# Patient Record
Sex: MALE | Race: WHITE | HISPANIC OR LATINO | Employment: UNEMPLOYED | URBAN - METROPOLITAN AREA
[De-identification: names, ages, dates, MRNs, and addresses within clinical notes are randomized per-mention and may not be internally consistent; named-entity substitution may affect disease eponyms.]

---

## 2018-04-16 ENCOUNTER — HOSPITAL ENCOUNTER (INPATIENT)
Facility: MEDICAL CENTER | Age: 38
LOS: 11 days | DRG: 544 | End: 2018-04-27
Attending: EMERGENCY MEDICINE | Admitting: INTERNAL MEDICINE
Payer: MEDICAID

## 2018-04-16 ENCOUNTER — RESOLUTE PROFESSIONAL BILLING HOSPITAL PROF FEE (OUTPATIENT)
Dept: HOSPITALIST | Facility: MEDICAL CENTER | Age: 38
End: 2018-04-16
Payer: MEDICAID

## 2018-04-16 ENCOUNTER — HOSPITAL ENCOUNTER (OUTPATIENT)
Dept: RADIOLOGY | Facility: MEDICAL CENTER | Age: 38
End: 2018-04-16

## 2018-04-16 ENCOUNTER — APPOINTMENT (OUTPATIENT)
Dept: RADIOLOGY | Facility: MEDICAL CENTER | Age: 38
DRG: 544 | End: 2018-04-16
Attending: EMERGENCY MEDICINE
Payer: MEDICAID

## 2018-04-16 DIAGNOSIS — M25.569 KNEE PAIN, UNSPECIFIED CHRONICITY, UNSPECIFIED LATERALITY: ICD-10-CM

## 2018-04-16 DIAGNOSIS — S72.91XA CLOSED FRACTURE OF RIGHT FEMUR, UNSPECIFIED FRACTURE MORPHOLOGY, UNSPECIFIED PORTION OF FEMUR, INITIAL ENCOUNTER (HCC): ICD-10-CM

## 2018-04-16 DIAGNOSIS — R74.8 ELEVATED ALKALINE PHOSPHATASE LEVEL: ICD-10-CM

## 2018-04-16 DIAGNOSIS — M84.551K: ICD-10-CM

## 2018-04-16 DIAGNOSIS — S72.90XA CLOSED FRACTURE OF FEMUR, UNSPECIFIED FRACTURE MORPHOLOGY, UNSPECIFIED LATERALITY, UNSPECIFIED PORTION OF FEMUR, INITIAL ENCOUNTER (HCC): ICD-10-CM

## 2018-04-16 LAB
ALBUMIN SERPL BCP-MCNC: 3.8 G/DL (ref 3.2–4.9)
ALBUMIN/GLOB SERPL: 1.4 G/DL
ALP SERPL-CCNC: 507 U/L (ref 30–99)
ALT SERPL-CCNC: 26 U/L (ref 2–50)
ANION GAP SERPL CALC-SCNC: 7 MMOL/L (ref 0–11.9)
APTT PPP: 23.6 SEC (ref 24.7–36)
AST SERPL-CCNC: 32 U/L (ref 12–45)
BASOPHILS # BLD AUTO: 0.2 % (ref 0–1.8)
BASOPHILS # BLD: 0.02 K/UL (ref 0–0.12)
BILIRUB SERPL-MCNC: 0.5 MG/DL (ref 0.1–1.5)
BUN SERPL-MCNC: 16 MG/DL (ref 8–22)
CALCIUM SERPL-MCNC: 8.4 MG/DL (ref 8.5–10.5)
CHLORIDE SERPL-SCNC: 108 MMOL/L (ref 96–112)
CO2 SERPL-SCNC: 27 MMOL/L (ref 20–33)
CREAT SERPL-MCNC: 1.04 MG/DL (ref 0.5–1.4)
EOSINOPHIL # BLD AUTO: 0.08 K/UL (ref 0–0.51)
EOSINOPHIL NFR BLD: 0.8 % (ref 0–6.9)
ERYTHROCYTE [DISTWIDTH] IN BLOOD BY AUTOMATED COUNT: 42.7 FL (ref 35.9–50)
GLOBULIN SER CALC-MCNC: 2.7 G/DL (ref 1.9–3.5)
GLUCOSE SERPL-MCNC: 100 MG/DL (ref 65–99)
HCT VFR BLD AUTO: 43.6 % (ref 42–52)
HGB BLD-MCNC: 14.5 G/DL (ref 14–18)
IMM GRANULOCYTES # BLD AUTO: 0.03 K/UL (ref 0–0.11)
IMM GRANULOCYTES NFR BLD AUTO: 0.3 % (ref 0–0.9)
INR PPP: 1.08 (ref 0.87–1.13)
LYMPHOCYTES # BLD AUTO: 1.7 K/UL (ref 1–4.8)
LYMPHOCYTES NFR BLD: 16.8 % (ref 22–41)
MCH RBC QN AUTO: 30.6 PG (ref 27–33)
MCHC RBC AUTO-ENTMCNC: 33.3 G/DL (ref 33.7–35.3)
MCV RBC AUTO: 92 FL (ref 81.4–97.8)
MONOCYTES # BLD AUTO: 0.62 K/UL (ref 0–0.85)
MONOCYTES NFR BLD AUTO: 6.1 % (ref 0–13.4)
NEUTROPHILS # BLD AUTO: 7.66 K/UL (ref 1.82–7.42)
NEUTROPHILS NFR BLD: 75.8 % (ref 44–72)
NRBC # BLD AUTO: 0 K/UL
NRBC BLD-RTO: 0 /100 WBC
PLATELET # BLD AUTO: 200 K/UL (ref 164–446)
PMV BLD AUTO: 10.8 FL (ref 9–12.9)
POTASSIUM SERPL-SCNC: 3.7 MMOL/L (ref 3.6–5.5)
PROT SERPL-MCNC: 6.5 G/DL (ref 6–8.2)
PROTHROMBIN TIME: 13.7 SEC (ref 12–14.6)
RBC # BLD AUTO: 4.74 M/UL (ref 4.7–6.1)
SODIUM SERPL-SCNC: 142 MMOL/L (ref 135–145)
WBC # BLD AUTO: 10.1 K/UL (ref 4.8–10.8)

## 2018-04-16 PROCEDURE — 700111 HCHG RX REV CODE 636 W/ 250 OVERRIDE (IP): Performed by: EMERGENCY MEDICINE

## 2018-04-16 PROCEDURE — 700111 HCHG RX REV CODE 636 W/ 250 OVERRIDE (IP): Performed by: INTERNAL MEDICINE

## 2018-04-16 PROCEDURE — 99285 EMERGENCY DEPT VISIT HI MDM: CPT

## 2018-04-16 PROCEDURE — 73700 CT LOWER EXTREMITY W/O DYE: CPT | Mod: LT

## 2018-04-16 PROCEDURE — 770001 HCHG ROOM/CARE - MED/SURG/GYN PRIV*

## 2018-04-16 PROCEDURE — A9270 NON-COVERED ITEM OR SERVICE: HCPCS | Performed by: INTERNAL MEDICINE

## 2018-04-16 PROCEDURE — 85730 THROMBOPLASTIN TIME PARTIAL: CPT

## 2018-04-16 PROCEDURE — 96374 THER/PROPH/DIAG INJ IV PUSH: CPT

## 2018-04-16 PROCEDURE — 85025 COMPLETE CBC W/AUTO DIFF WBC: CPT

## 2018-04-16 PROCEDURE — 80053 COMPREHEN METABOLIC PANEL: CPT

## 2018-04-16 PROCEDURE — 96376 TX/PRO/DX INJ SAME DRUG ADON: CPT

## 2018-04-16 PROCEDURE — 96375 TX/PRO/DX INJ NEW DRUG ADDON: CPT

## 2018-04-16 PROCEDURE — 99223 1ST HOSP IP/OBS HIGH 75: CPT | Performed by: INTERNAL MEDICINE

## 2018-04-16 PROCEDURE — 700105 HCHG RX REV CODE 258: Performed by: INTERNAL MEDICINE

## 2018-04-16 PROCEDURE — 85610 PROTHROMBIN TIME: CPT

## 2018-04-16 PROCEDURE — 700105 HCHG RX REV CODE 258: Performed by: EMERGENCY MEDICINE

## 2018-04-16 PROCEDURE — 700102 HCHG RX REV CODE 250 W/ 637 OVERRIDE(OP): Performed by: INTERNAL MEDICINE

## 2018-04-16 RX ORDER — MORPHINE SULFATE 4 MG/ML
4 INJECTION, SOLUTION INTRAMUSCULAR; INTRAVENOUS
Status: DISCONTINUED | OUTPATIENT
Start: 2018-04-16 | End: 2018-04-16

## 2018-04-16 RX ORDER — AMOXICILLIN 250 MG
2 CAPSULE ORAL 2 TIMES DAILY
Status: DISCONTINUED | OUTPATIENT
Start: 2018-04-16 | End: 2018-04-27 | Stop reason: HOSPADM

## 2018-04-16 RX ORDER — SODIUM CHLORIDE 9 MG/ML
INJECTION, SOLUTION INTRAVENOUS CONTINUOUS
Status: DISCONTINUED | OUTPATIENT
Start: 2018-04-16 | End: 2018-04-16

## 2018-04-16 RX ORDER — ONDANSETRON 2 MG/ML
4 INJECTION INTRAMUSCULAR; INTRAVENOUS
Status: DISCONTINUED | OUTPATIENT
Start: 2018-04-16 | End: 2018-04-16

## 2018-04-16 RX ORDER — OXYCODONE HCL 10 MG/1
10 TABLET, FILM COATED, EXTENDED RELEASE ORAL EVERY 12 HOURS
Status: DISCONTINUED | OUTPATIENT
Start: 2018-04-16 | End: 2018-04-27 | Stop reason: HOSPADM

## 2018-04-16 RX ORDER — ONDANSETRON 2 MG/ML
4 INJECTION INTRAMUSCULAR; INTRAVENOUS EVERY 4 HOURS PRN
Status: DISCONTINUED | OUTPATIENT
Start: 2018-04-16 | End: 2018-04-27 | Stop reason: HOSPADM

## 2018-04-16 RX ORDER — BISACODYL 10 MG
10 SUPPOSITORY, RECTAL RECTAL
Status: DISCONTINUED | OUTPATIENT
Start: 2018-04-16 | End: 2018-04-27 | Stop reason: HOSPADM

## 2018-04-16 RX ORDER — SODIUM CHLORIDE 9 MG/ML
INJECTION, SOLUTION INTRAVENOUS CONTINUOUS
Status: DISPENSED | OUTPATIENT
Start: 2018-04-16 | End: 2018-04-17

## 2018-04-16 RX ORDER — MORPHINE SULFATE 4 MG/ML
2 INJECTION, SOLUTION INTRAMUSCULAR; INTRAVENOUS
Status: DISCONTINUED | OUTPATIENT
Start: 2018-04-16 | End: 2018-04-17

## 2018-04-16 RX ORDER — ACETAMINOPHEN 325 MG/1
650 TABLET ORAL EVERY 6 HOURS PRN
Status: DISCONTINUED | OUTPATIENT
Start: 2018-04-16 | End: 2018-04-27 | Stop reason: HOSPADM

## 2018-04-16 RX ORDER — POLYETHYLENE GLYCOL 3350 17 G/17G
1 POWDER, FOR SOLUTION ORAL
Status: DISCONTINUED | OUTPATIENT
Start: 2018-04-16 | End: 2018-04-27 | Stop reason: HOSPADM

## 2018-04-16 RX ORDER — ACETAMINOPHEN 500 MG
1000 TABLET ORAL EVERY 6 HOURS
Status: DISCONTINUED | OUTPATIENT
Start: 2018-04-17 | End: 2018-04-27 | Stop reason: HOSPADM

## 2018-04-16 RX ORDER — DIAZEPAM 2 MG/1
2 TABLET ORAL EVERY 6 HOURS PRN
Status: DISCONTINUED | OUTPATIENT
Start: 2018-04-16 | End: 2018-04-27 | Stop reason: HOSPADM

## 2018-04-16 RX ORDER — ONDANSETRON 4 MG/1
4 TABLET, ORALLY DISINTEGRATING ORAL EVERY 4 HOURS PRN
Status: DISCONTINUED | OUTPATIENT
Start: 2018-04-16 | End: 2018-04-27 | Stop reason: HOSPADM

## 2018-04-16 RX ADMIN — SODIUM CHLORIDE: 9 INJECTION, SOLUTION INTRAVENOUS at 16:58

## 2018-04-16 RX ADMIN — ONDANSETRON HYDROCHLORIDE 4 MG: 2 INJECTION, SOLUTION INTRAMUSCULAR; INTRAVENOUS at 16:58

## 2018-04-16 RX ADMIN — OXYCODONE HYDROCHLORIDE 10 MG: 10 TABLET, FILM COATED, EXTENDED RELEASE ORAL at 22:27

## 2018-04-16 RX ADMIN — MORPHINE SULFATE 2 MG: 4 INJECTION INTRAVENOUS at 22:15

## 2018-04-16 RX ADMIN — DIAZEPAM 2 MG: 2 TABLET ORAL at 18:55

## 2018-04-16 RX ADMIN — MORPHINE SULFATE 2 MG: 4 INJECTION INTRAVENOUS at 18:57

## 2018-04-16 RX ADMIN — SODIUM CHLORIDE: 9 INJECTION, SOLUTION INTRAVENOUS at 18:59

## 2018-04-16 RX ADMIN — MORPHINE SULFATE 4 MG: 4 INJECTION INTRAVENOUS at 16:58

## 2018-04-16 ASSESSMENT — PAIN SCALES - GENERAL
PAINLEVEL_OUTOF10: 7
PAINLEVEL_OUTOF10: 9
PAINLEVEL_OUTOF10: 7
PAINLEVEL_OUTOF10: 5
PAINLEVEL_OUTOF10: 7
PAINLEVEL_OUTOF10: 5

## 2018-04-16 ASSESSMENT — ENCOUNTER SYMPTOMS
ABDOMINAL PAIN: 0
MYALGIAS: 1
FOCAL WEAKNESS: 1
WEIGHT LOSS: 0
CHILLS: 0
DIZZINESS: 0
LOSS OF CONSCIOUSNESS: 0
FEVER: 0
PALPITATIONS: 0
DEPRESSION: 0
NAUSEA: 0
SHORTNESS OF BREATH: 0
VOMITING: 0
NECK PAIN: 0
BLURRED VISION: 0
BRUISES/BLEEDS EASILY: 0

## 2018-04-16 ASSESSMENT — PATIENT HEALTH QUESTIONNAIRE - PHQ9
1. LITTLE INTEREST OR PLEASURE IN DOING THINGS: NOT AT ALL
2. FEELING DOWN, DEPRESSED, IRRITABLE, OR HOPELESS: NOT AT ALL
SUM OF ALL RESPONSES TO PHQ9 QUESTIONS 1 AND 2: 0

## 2018-04-16 ASSESSMENT — LIFESTYLE VARIABLES
EVER_SMOKED: YES
DO YOU DRINK ALCOHOL: NO
ALCOHOL_USE: NO

## 2018-04-17 ENCOUNTER — APPOINTMENT (OUTPATIENT)
Dept: RADIOLOGY | Facility: MEDICAL CENTER | Age: 38
DRG: 544 | End: 2018-04-17
Attending: EMERGENCY MEDICINE
Payer: MEDICAID

## 2018-04-17 ENCOUNTER — APPOINTMENT (OUTPATIENT)
Dept: RADIOLOGY | Facility: MEDICAL CENTER | Age: 38
DRG: 544 | End: 2018-04-17
Attending: ORTHOPAEDIC SURGERY
Payer: MEDICAID

## 2018-04-17 PROBLEM — M84.453A PATHOLOGIC FRACTURE OF FEMUR (HCC): Status: ACTIVE | Noted: 2018-04-16

## 2018-04-17 LAB
ANION GAP SERPL CALC-SCNC: 3 MMOL/L (ref 0–11.9)
BASOPHILS # BLD AUTO: 0.3 % (ref 0–1.8)
BASOPHILS # BLD: 0.03 K/UL (ref 0–0.12)
BUN SERPL-MCNC: 14 MG/DL (ref 8–22)
CALCIUM SERPL-MCNC: 8.1 MG/DL (ref 8.5–10.5)
CHLORIDE SERPL-SCNC: 105 MMOL/L (ref 96–112)
CO2 SERPL-SCNC: 29 MMOL/L (ref 20–33)
CREAT SERPL-MCNC: 1.07 MG/DL (ref 0.5–1.4)
EOSINOPHIL # BLD AUTO: 0.17 K/UL (ref 0–0.51)
EOSINOPHIL NFR BLD: 1.9 % (ref 0–6.9)
ERYTHROCYTE [DISTWIDTH] IN BLOOD BY AUTOMATED COUNT: 44.7 FL (ref 35.9–50)
GLUCOSE SERPL-MCNC: 112 MG/DL (ref 65–99)
HCT VFR BLD AUTO: 40.1 % (ref 42–52)
HGB BLD-MCNC: 13.1 G/DL (ref 14–18)
IMM GRANULOCYTES # BLD AUTO: 0.04 K/UL (ref 0–0.11)
IMM GRANULOCYTES NFR BLD AUTO: 0.5 % (ref 0–0.9)
LYMPHOCYTES # BLD AUTO: 2.14 K/UL (ref 1–4.8)
LYMPHOCYTES NFR BLD: 24.4 % (ref 22–41)
MCH RBC QN AUTO: 30.6 PG (ref 27–33)
MCHC RBC AUTO-ENTMCNC: 32.7 G/DL (ref 33.7–35.3)
MCV RBC AUTO: 93.7 FL (ref 81.4–97.8)
MONOCYTES # BLD AUTO: 0.76 K/UL (ref 0–0.85)
MONOCYTES NFR BLD AUTO: 8.7 % (ref 0–13.4)
NEUTROPHILS # BLD AUTO: 5.62 K/UL (ref 1.82–7.42)
NEUTROPHILS NFR BLD: 64.2 % (ref 44–72)
NRBC # BLD AUTO: 0 K/UL
NRBC BLD-RTO: 0 /100 WBC
PLATELET # BLD AUTO: 183 K/UL (ref 164–446)
PMV BLD AUTO: 11 FL (ref 9–12.9)
POTASSIUM SERPL-SCNC: 4.2 MMOL/L (ref 3.6–5.5)
RBC # BLD AUTO: 4.28 M/UL (ref 4.7–6.1)
SODIUM SERPL-SCNC: 137 MMOL/L (ref 135–145)
WBC # BLD AUTO: 8.8 K/UL (ref 4.8–10.8)

## 2018-04-17 PROCEDURE — 700111 HCHG RX REV CODE 636 W/ 250 OVERRIDE (IP): Performed by: STUDENT IN AN ORGANIZED HEALTH CARE EDUCATION/TRAINING PROGRAM

## 2018-04-17 PROCEDURE — 73706 CT ANGIO LWR EXTR W/O&W/DYE: CPT | Mod: RT

## 2018-04-17 PROCEDURE — 770001 HCHG ROOM/CARE - MED/SURG/GYN PRIV*

## 2018-04-17 PROCEDURE — 36415 COLL VENOUS BLD VENIPUNCTURE: CPT

## 2018-04-17 PROCEDURE — 700117 HCHG RX CONTRAST REV CODE 255: Performed by: EMERGENCY MEDICINE

## 2018-04-17 PROCEDURE — 700111 HCHG RX REV CODE 636 W/ 250 OVERRIDE (IP): Performed by: INTERNAL MEDICINE

## 2018-04-17 PROCEDURE — 85025 COMPLETE CBC W/AUTO DIFF WBC: CPT

## 2018-04-17 PROCEDURE — 99231 SBSQ HOSP IP/OBS SF/LOW 25: CPT | Performed by: INTERNAL MEDICINE

## 2018-04-17 PROCEDURE — 302830 HCHG BUCKS UNIVERSAL TRACTION BOOT

## 2018-04-17 PROCEDURE — A9270 NON-COVERED ITEM OR SERVICE: HCPCS | Performed by: INTERNAL MEDICINE

## 2018-04-17 PROCEDURE — 51798 US URINE CAPACITY MEASURE: CPT

## 2018-04-17 PROCEDURE — 700102 HCHG RX REV CODE 250 W/ 637 OVERRIDE(OP): Performed by: INTERNAL MEDICINE

## 2018-04-17 PROCEDURE — 80048 BASIC METABOLIC PNL TOTAL CA: CPT

## 2018-04-17 PROCEDURE — 700102 HCHG RX REV CODE 250 W/ 637 OVERRIDE(OP): Performed by: ORTHOPAEDIC SURGERY

## 2018-04-17 PROCEDURE — A9270 NON-COVERED ITEM OR SERVICE: HCPCS | Performed by: ORTHOPAEDIC SURGERY

## 2018-04-17 PROCEDURE — 2W3LXYZ IMMOBILIZATION OF RIGHT LOWER EXTREMITY USING OTHER DEVICE: ICD-10-PCS | Performed by: ORTHOPAEDIC SURGERY

## 2018-04-17 PROCEDURE — 700105 HCHG RX REV CODE 258: Performed by: INTERNAL MEDICINE

## 2018-04-17 RX ORDER — OXYCODONE HYDROCHLORIDE 10 MG/1
10 TABLET ORAL EVERY 4 HOURS PRN
Status: DISCONTINUED | OUTPATIENT
Start: 2018-04-17 | End: 2018-04-25

## 2018-04-17 RX ORDER — MORPHINE SULFATE 4 MG/ML
4 INJECTION, SOLUTION INTRAMUSCULAR; INTRAVENOUS
Status: DISCONTINUED | OUTPATIENT
Start: 2018-04-17 | End: 2018-04-25

## 2018-04-17 RX ORDER — MORPHINE SULFATE 4 MG/ML
2 INJECTION, SOLUTION INTRAMUSCULAR; INTRAVENOUS ONCE
Status: COMPLETED | OUTPATIENT
Start: 2018-04-17 | End: 2018-04-17

## 2018-04-17 RX ORDER — KETOROLAC TROMETHAMINE 30 MG/ML
30 INJECTION, SOLUTION INTRAMUSCULAR; INTRAVENOUS EVERY 6 HOURS PRN
Status: DISPENSED | OUTPATIENT
Start: 2018-04-17 | End: 2018-04-22

## 2018-04-17 RX ORDER — OXYCODONE HYDROCHLORIDE 5 MG/1
5 TABLET ORAL EVERY 4 HOURS PRN
Status: DISCONTINUED | OUTPATIENT
Start: 2018-04-17 | End: 2018-04-25

## 2018-04-17 RX ADMIN — OXYCODONE HYDROCHLORIDE 10 MG: 10 TABLET, FILM COATED, EXTENDED RELEASE ORAL at 20:01

## 2018-04-17 RX ADMIN — OXYCODONE HYDROCHLORIDE 10 MG: 10 TABLET ORAL at 17:18

## 2018-04-17 RX ADMIN — DIAZEPAM 2 MG: 2 TABLET ORAL at 15:26

## 2018-04-17 RX ADMIN — MORPHINE SULFATE 2 MG: 4 INJECTION INTRAVENOUS at 03:08

## 2018-04-17 RX ADMIN — DIAZEPAM 2 MG: 2 TABLET ORAL at 07:20

## 2018-04-17 RX ADMIN — MORPHINE SULFATE 4 MG: 4 INJECTION INTRAVENOUS at 13:04

## 2018-04-17 RX ADMIN — DIAZEPAM 2 MG: 2 TABLET ORAL at 01:14

## 2018-04-17 RX ADMIN — OXYCODONE HYDROCHLORIDE 10 MG: 10 TABLET, FILM COATED, EXTENDED RELEASE ORAL at 08:56

## 2018-04-17 RX ADMIN — ACETAMINOPHEN 1000 MG: 500 TABLET ORAL at 17:18

## 2018-04-17 RX ADMIN — OXYCODONE HYDROCHLORIDE 10 MG: 10 TABLET ORAL at 12:01

## 2018-04-17 RX ADMIN — MORPHINE SULFATE 4 MG: 4 INJECTION INTRAVENOUS at 20:01

## 2018-04-17 RX ADMIN — MORPHINE SULFATE 2 MG: 4 INJECTION INTRAVENOUS at 01:15

## 2018-04-17 RX ADMIN — DIAZEPAM 2 MG: 2 TABLET ORAL at 21:40

## 2018-04-17 RX ADMIN — ACETAMINOPHEN 1000 MG: 500 TABLET ORAL at 07:20

## 2018-04-17 RX ADMIN — MORPHINE SULFATE 2 MG: 4 INJECTION INTRAVENOUS at 04:37

## 2018-04-17 RX ADMIN — ACETAMINOPHEN 1000 MG: 500 TABLET ORAL at 12:01

## 2018-04-17 RX ADMIN — SODIUM CHLORIDE: 9 INJECTION, SOLUTION INTRAVENOUS at 12:10

## 2018-04-17 RX ADMIN — OXYCODONE HYDROCHLORIDE 10 MG: 10 TABLET ORAL at 21:38

## 2018-04-17 RX ADMIN — ACETAMINOPHEN 1000 MG: 500 TABLET ORAL at 01:14

## 2018-04-17 RX ADMIN — MORPHINE SULFATE 4 MG: 4 INJECTION INTRAVENOUS at 09:50

## 2018-04-17 RX ADMIN — MORPHINE SULFATE 4 MG: 4 INJECTION INTRAVENOUS at 15:22

## 2018-04-17 RX ADMIN — ENOXAPARIN SODIUM 40 MG: 100 INJECTION SUBCUTANEOUS at 12:17

## 2018-04-17 RX ADMIN — IOHEXOL 100 ML: 350 INJECTION, SOLUTION INTRAVENOUS at 08:15

## 2018-04-17 RX ADMIN — MORPHINE SULFATE 2 MG: 4 INJECTION INTRAVENOUS at 07:50

## 2018-04-17 RX ADMIN — SODIUM CHLORIDE: 9 INJECTION, SOLUTION INTRAVENOUS at 01:18

## 2018-04-17 ASSESSMENT — ENCOUNTER SYMPTOMS
ABDOMINAL PAIN: 0
CHILLS: 0
VOMITING: 0
WEIGHT LOSS: 0
DEPRESSION: 0
NERVOUS/ANXIOUS: 1
SHORTNESS OF BREATH: 0
NAUSEA: 0
FEVER: 0

## 2018-04-17 ASSESSMENT — PAIN SCALES - GENERAL
PAINLEVEL_OUTOF10: 9
PAINLEVEL_OUTOF10: 8
PAINLEVEL_OUTOF10: 9
PAINLEVEL_OUTOF10: 9
PAINLEVEL_OUTOF10: 5
PAINLEVEL_OUTOF10: 8
PAINLEVEL_OUTOF10: 7
PAINLEVEL_OUTOF10: 8
PAINLEVEL_OUTOF10: 9
PAINLEVEL_OUTOF10: 10
PAINLEVEL_OUTOF10: 7
PAINLEVEL_OUTOF10: 9
PAINLEVEL_OUTOF10: 4
PAINLEVEL_OUTOF10: 9

## 2018-04-17 ASSESSMENT — COPD QUESTIONNAIRES
DO YOU EVER COUGH UP ANY MUCUS OR PHLEGM?: NO/ONLY WITH OCCASIONAL COLDS OR INFECTIONS
HAVE YOU SMOKED AT LEAST 100 CIGARETTES IN YOUR ENTIRE LIFE: YES
DURING THE PAST 4 WEEKS HOW MUCH DID YOU FEEL SHORT OF BREATH: NONE/LITTLE OF THE TIME
COPD SCREENING SCORE: 2

## 2018-04-17 ASSESSMENT — LIFESTYLE VARIABLES: EVER_SMOKED: YES

## 2018-04-17 NOTE — H&P
Hospital Medicine History and Physical    Date of Service  4/16/2018    Chief Complaint  Chief Complaint   Patient presents with   • Leg Injury       History of Presenting Illness  37 y.o. male who presented 4/16/2018 with above chief complaint. And states that he's been having knee pain over the last several weeks and this was given indomethacin but never received it at the [facility]. Patient states that today he was just sitting crosslegged and then went to go expand his legs in and felt a sudden pop and crack just above his right knee min had tremendous 10/ 10 sharp stabbing pain that shot down his leg. He was highly concerned that he had broken it and therefore got an x-ray done at the [facility] which showed a distal femur fracture and he was transferred here. Patient denies any obvious trauma other nurse and endorses some mild numbness of the right foot but has good range of motion in size about 8 out of 10 pain with associated severe muscle spasms now. Denies any weight loss or weight gain denies any nausea vomiting fevers or chills and denies any new other medications. Orthopedic surgery was consulted in the ER.    Primary Care Physician  Pcp Pt States None    Code Status  fc/fc    Review of Systems  Review of Systems   Constitutional: Negative for chills, fever and weight loss.   HENT: Negative for hearing loss.    Eyes: Negative for blurred vision.   Respiratory: Negative for shortness of breath.    Cardiovascular: Positive for leg swelling. Negative for chest pain and palpitations.   Gastrointestinal: Negative for abdominal pain, nausea and vomiting.   Genitourinary: Negative for dysuria and urgency.   Musculoskeletal: Positive for joint pain and myalgias. Negative for neck pain.   Skin: Negative for itching.   Neurological: Positive for focal weakness. Negative for dizziness and loss of consciousness.   Endo/Heme/Allergies: Does not bruise/bleed easily.   Psychiatric/Behavioral: Negative for depression.    All other systems reviewed and are negative.         Past Medical History  None per patient    Surgical History  Denies any prior surgeries    Medications  No current facility-administered medications on file prior to encounter.      No current outpatient prescriptions on file prior to encounter.       Family History  No history of malignancies    Social History  Social History   Substance Use Topics   • Smoking status: Never Smoker   • Smokeless tobacco: Never Used   • Alcohol use No       Allergies  No Known Allergies     Physical Exam  Laboratory   Hemodynamics  Temp (24hrs), Av.9 °C (98.4 °F), Min:36.8 °C (98.2 °F), Max:37 °C (98.6 °F)   Temperature: 37 °C (98.6 °F)  Pulse  Av.4  Min: 62  Max: 90 Heart Rate (Monitored): 64  Blood Pressure: 145/84, NIBP: 147/87      Respiratory      Respiration: 16, Pulse Oximetry: 99 %             Physical Exam   Constitutional: He is oriented to person, place, and time. He appears well-developed and well-nourished. No distress.   Laying supine in mild discomfort   HENT:   Head: Normocephalic and atraumatic.   Mouth/Throat: No oropharyngeal exudate.   Eyes: Pupils are equal, round, and reactive to light. No scleral icterus.   Neck: Normal range of motion. Neck supple. No thyromegaly present.   Cardiovascular: Normal rate, regular rhythm, normal heart sounds and intact distal pulses.    No murmur heard.  Pulmonary/Chest: Effort normal and breath sounds normal. No respiratory distress. He has no wheezes.   Abdominal: Soft. Bowel sounds are normal. He exhibits no distension. There is no tenderness.   Musculoskeletal: Normal range of motion. He exhibits edema and tenderness.   RLE in elongated ice packet, severe TTP in the distal femur, decrease sensation to soft touch R foot and associated toes, but good capillary refill throughout and good ROM   Neurological: He is alert and oriented to person, place, and time. No cranial nerve deficit. Coordination abnormal.   Skin:  Skin is warm and dry. No rash noted.   Psychiatric: He has a normal mood and affect.   Nursing note and vitals reviewed.      Recent Labs      04/16/18   1708   WBC  10.1   RBC  4.74   HEMOGLOBIN  14.5   HEMATOCRIT  43.6   MCV  92.0   MCH  30.6   MCHC  33.3*   RDW  42.7   PLATELETCT  200   MPV  10.8     Recent Labs      04/16/18   1708   SODIUM  142   POTASSIUM  3.7   CHLORIDE  108   CO2  27   GLUCOSE  100*   BUN  16   CREATININE  1.04   CALCIUM  8.4*     Recent Labs      04/16/18   1708   APTT  23.6*   INR  1.08               Imaging  CT-EXTREMITY, LOWER W/O LEFT   Final Result      Comminuted angulated displaced distal right femoral fracture.   Suspect underlying malignant bone lesion.      OUTSIDE IMAGES-DX LOWER EXTREMITY, RIGHT   Final Result      MR-FEMUR-WITH & W/O RIGHT    (Results Pending)        Assessment/Plan     I anticipate this patient will require at least two midnights for appropriate medical management, necessitating inpatient admission.    * Femur fracture (CMS-HCC)- (present on admission)   Assessment & Plan    -Nontraumatic and in the distal part  -CT scan is highly concerning for primary bone cancer and also given the history of known trauma and also grossly elevated alkaline phosphatase with consider possible Paget's disease versus osteosarcoma versus evening sarcoma  -Orthopedic surgeries been consulted  -Pain control multiple pain therapy  -Maintain in a sling  -Likely will need transfer to a tertiary academic Medical Center  -MRI of the femur without/with contrast        Elevated alkaline phosphatase level- (present on admission)   Assessment & Plan    -Highly concerning for possible primary bone cancer, no hypercalcemia or kidney failure on labs        Knee pain- (present on admission)   Assessment & Plan    -Likely element of osteoarthritis and institute multimodal pain therapy            VTE prophylaxis SCD's.

## 2018-04-17 NOTE — PROGRESS NOTES
Report received. Assumed care of pt. A/O x4. VSS. Responds appropriately.c/o of pain, medicated per MAR. Assessment complete, RLE NWB,splint in place. Pending MRI. senior living guards at bedside. Explained importance of calling before getting OOB. Call light and belongings within reach. . Bed in the lowest position. Treaded socks in place. Hourly rounding in progress. Will continue to monitor .

## 2018-04-17 NOTE — DISCHARGE PLANNING
Ty from [facility] called stating that patient is declined due to lack of financial clearance. They do not have contracts with any correctional facilities and will not do a HSUSEIN with correctional facilities for patient transfer.     Left message for floor CM to call back for update. SW updated.

## 2018-04-17 NOTE — ED PROVIDER NOTES
"ED Provider Note    CHIEF COMPLAINT  Chief Complaint   Patient presents with   • Leg Injury       HPI  Renetta Tucker is a 37 y.o. male who presents complaining of a leg injury. The patient was sitting crosslegged on his bunk, moved awkwardly, and felt a snap. He's had some issues with his knee before, the last 3 weeks or so. He was told it was a strained meniscus. He localizes that pain to the tibia more so than the femur. He was supposed to be on Indocin, however has not yet started that. Today's pain hurts quite a bit in his distal right thigh. He feels some numbness in the tips of his toes which he thinks is from the splint. He denies any weakness at all. Denies any pain in the foot. Denies any chest pain or shortness of breath. Last food was about 10:30 this morning. There is no other complaint.    PAST MEDICAL HISTORY  None    FAMILY HISTORY  History reviewed. No pertinent family history.    SOCIAL HISTORY  Social History   Substance Use Topics   • Smoking status: Never Smoker   • Smokeless tobacco: Never Used   • Alcohol use No     He is incarcerated    SURGICAL HISTORY  History reviewed. No pertinent surgical history.    CURRENT MEDICATIONS  Home Medications     Reviewed by Ronda Wilkinson (Pharmacy Tech) on 04/16/18 at 1751  Med List Status: Complete   Medication Last Dose Status   multivitamin (THERAGRAN) Tab 4/16/2018 Active                I have reviewed the nurses notes and/or the list brought with the patient.    ALLERGIES  No Known Allergies    REVIEW OF SYSTEMS  See HPI for further details. Review of systems as above, otherwise all other systems are negative.     PHYSICAL EXAM  VITAL SIGNS: /81   Pulse 90   Temp 36.8 °C (98.2 °F)   Resp 18   Ht 2.108 m (6' 11\")   Wt 83.9 kg (185 lb)   SpO2 92%   BMI 18.88 kg/m²   Constitutional: Well appearing patient in no acute distress.  Not toxic, nor ill in appearance.  HENT: Mucus membranes moist.  Oropharynx is clear.  Eyes: Pupils equally " round.  No scleral icterus.   Neck: Full nontender range of motion.  Lymphatic: No cervical lymphadenopathy noted.   Cardiovascular: Regular heart rate and rhythm.  No murmurs, rubs, nor gallop appreciated.   Thorax & Lungs: Chest is nontender.  Lungs are clear to auscultation with good air movement bilaterally.  No wheeze, rhonchi, nor rales.   Abdomen: Soft, with no tenderness, rebound nor guarding.  No mass, pulsatile mass, nor hepatosplenomegaly appreciated.  Skin: No purpura nor petechia noted.  Extremities/Musculoskeletal: He has tenderness and edema about the distal thigh. This was examined with an air splint in place. I did not remove it. The foot is warm and well perfused. He has no objective deficit in sensation. Pulses strong and bounding the foot.  Neurologic: Alert & oriented. As above.  Psychiatric: Normal affect appropriate for the clinical situation.      LABS  Labs Reviewed   CBC WITH DIFFERENTIAL - Abnormal; Notable for the following:        Result Value    MCHC 33.3 (*)     Neutrophils-Polys 75.80 (*)     Lymphocytes 16.80 (*)     Neutrophils (Absolute) 7.66 (*)     All other components within normal limits    Narrative:     Indicate which anticoagulants the patient is on:->NONE   COMP METABOLIC PANEL - Abnormal; Notable for the following:     Glucose 100 (*)     Calcium 8.4 (*)     Alkaline Phosphatase 507 (*)     All other components within normal limits    Narrative:     Indicate which anticoagulants the patient is on:->NONE   APTT - Abnormal; Notable for the following:     APTT 23.6 (*)     All other components within normal limits    Narrative:     Indicate which anticoagulants the patient is on:->NONE   PROTHROMBIN TIME    Narrative:     Indicate which anticoagulants the patient is on:->NONE   ESTIMATED GFR    Narrative:     Indicate which anticoagulants the patient is on:->NONE         RADIOLOGY/PROCEDURES  I have reviewed the patient's film interpretations myself, and they are read out by  the radiologist as:   OUTSIDE IMAGES-DX LOWER EXTREMITY, RIGHT   Final Result      CT-EXTREMITY, LOWER W/O LEFT    (Results Pending)     .    MEDICAL RECORD  I have reviewed patient's medical record and pertinent results are listed above.    COURSE & MEDICAL DECISION MAKING  I have reviewed any medical record information, laboratory studies and radiographic results as noted above.  Patient comes in with an injury to his leg which he is sustained a fracture. The case is discussed with the orthopedist who agrees with a CT to evaluate for malignant fracture. In the interim written for analgesia. He is admitted to the hospitalist service.      FINAL IMPRESSION  1. Closed fracture of right femur, unspecified fracture morphology, unspecified portion of femur, initial encounter (CMS-[facility])           This dictation was created using voice recognition software.    Electronically signed by: Luís Vásquez, 4/16/2018 5:52 PM

## 2018-04-17 NOTE — DISCHARGE PLANNING
1335: Received call from WENDY/LATONYA requesting transfer to [facility] for further care due to an Orthopedic tumor. Called [facility] [phone] and spoke to Mark. Mark given patient information and faxed Facesheet per request.

## 2018-04-17 NOTE — PROGRESS NOTES
Knee immobilizer was delivered and fitted to patient RLE.  If any further assistance needed, please call extension [phone] or place order for Ortho Technician assistance as a communication order in EPIC. '

## 2018-04-17 NOTE — CARE PLAN
Problem: Safety  Goal: Will remain free from injury  Outcome: PROGRESSING AS EXPECTED  Treaded socks in place, bed in the lowest position, call light and belongings within reach, pt call for assistance appropriately    Problem: Pain Management  Goal: Pain level will decrease to patient's comfort goal  Outcome: PROGRESSING SLOWER THAN EXPECTED

## 2018-04-17 NOTE — PROGRESS NOTES
Orthopaedics  Patient seen and examined  Femur fracture with potential pathological changes, pending imaging  IF tumor, will need referral to cancer center  Syed

## 2018-04-17 NOTE — ASSESSMENT & PLAN NOTE
Patient w/ progressive pain R knee x 2 months  Swelling x 3 days PTA  Acute pain, Fx  CT suspcious for path Fx, MRI same- ESR does not support Dx osteo    Continue rle nwb, immobilization  Change pain regimen to morphine 4mg q3hrs prn for breakthrough, do oxycodone 10mg every 6hrs, add gabapentin 100 tid  Add flexeril tid  Lidocaine gel to the knee joint area for local relief    - anticipating transfer to [facility] to be evaluated by  (ortho-onc)

## 2018-04-17 NOTE — DISCHARGE PLANNING
1550: Called [facility] [phone] and spoke with Thu. Thu given patient information and faxed information per request.     1600: Thu called and states that they do not have the speciality the patient needs and therefore the patient is declined.     Floor CM left message to call back for update. SW updated    [facility] called and left message to call back.

## 2018-04-17 NOTE — CARE PLAN
Problem: Safety  Goal: Will remain free from injury  Outcome: PROGRESSING AS EXPECTED  3 prison guards in room at all times; patient in four point cuffs    Problem: Pain Management  Goal: Pain level will decrease to patient's comfort goal  Outcome: PROGRESSING AS EXPECTED  Pain medicated per MAR as needed

## 2018-04-17 NOTE — PROGRESS NOTES
Seen & examined  Admitted yesterday with femur fracture, possibly pathologic  MRI pending  Complains of muscle spasms & poor pain control    Vitals:    04/16/18 2030 04/17/18 0516 04/17/18 0800 04/17/18 0823   BP: 145/84 137/87 146/95    Pulse: 67 66 84 65   Resp:  16 17 16   Temp: 37 °C (98.6 °F) 37 °C (98.6 °F) 36.2 °C (97.1 °F)    SpO2:  97% 95% 96%   Weight:       Height:         RLE:  Splint in place  Skin intact  F/e ankle, toes  Foot w/w/p    R distal femur fracture, likely pathologic    - NWB RLE.  Will remove splint today and place into Irizarry's skin traction  - pain control  - Awaiting MRI  - will need transfer arranged to tertiary center for referral to orthopaedic tumor specialist for treatment.  Would not obtain biopsy of the lesion or other surgical intervention here.    - Dispo planning

## 2018-04-17 NOTE — PROGRESS NOTES
[facility] Hospitalist Progress Note    Date of Service: 2018    Chief Complaint  37 y.o. Previously healthy male prisoner admitted 2018 with 2 month h/o r knee pain and sudden increased pain with pathologic fracture of distal R femur.    Interval Problem Update  CT supports clinical suspicion of pathologic fracture, patient advised may end up losing leg, very distraught. Advised will need transfer to outside referral facility. Discussed w/ Corrections, RN, Case Management.    Consultants/Specialty  Orthopaedics    Disposition  Referral center        Review of Systems   Constitutional: Negative for chills, fever and weight loss.   Respiratory: Negative for shortness of breath.    Cardiovascular: Negative for chest pain.   Gastrointestinal: Negative for abdominal pain, nausea and vomiting.   Genitourinary: Negative for dysuria.   Psychiatric/Behavioral: Negative for depression and suicidal ideas. The patient is nervous/anxious.         Very distraught hard to gain perspective      Physical Exam  Laboratory/Imaging   Hemodynamics  Temp (24hrs), Av.7 °C (98.1 °F), Min:36.2 °C (97.1 °F), Max:37 °C (98.6 °F)   Temperature: 36.2 °C (97.1 °F)  Pulse  Av.5  Min: 62  Max: 90 Heart Rate (Monitored): 64  Blood Pressure: 146/95, NIBP: 147/87      Respiratory      Respiration: 16, Pulse Oximetry: 96 %, O2 Daily Delivery Respiratory : Room Air with O2 Available             Fluids    Intake/Output Summary (Last 24 hours) at 18 1147  Last data filed at 18 1000   Gross per 24 hour   Intake              600 ml   Output             1800 ml   Net            -1200 ml       Nutrition  Orders Placed This Encounter   Procedures   • DIET ORDER     Standing Status:   Standing     Number of Occurrences:   1     Order Specific Question:   Diet:     Answer:   Regular [1]     Physical Exam   Constitutional: He is oriented to person, place, and time. He appears well-developed and well-nourished. He appears distressed.    HENT:   Head: Normocephalic and atraumatic.   Eyes: EOM are normal. Pupils are equal, round, and reactive to light. Right eye exhibits no discharge. Left eye exhibits no discharge.   Neck: Neck supple.   Cardiovascular: Normal rate and regular rhythm.    Pulmonary/Chest: Effort normal and breath sounds normal.   Abdominal: Soft. Bowel sounds are normal.   Musculoskeletal: He exhibits edema, tenderness and deformity.   Distal R thigh swelling   Neurological: He is oriented to person, place, and time. No cranial nerve deficit.   Skin: Skin is warm and dry. He is not diaphoretic.   Numerous tattoos   Psychiatric:   anxious   Nursing note and vitals reviewed.      Recent Labs      04/16/18   1708  04/17/18   0357   WBC  10.1  8.8   RBC  4.74  4.28*   HEMOGLOBIN  14.5  13.1*   HEMATOCRIT  43.6  40.1*   MCV  92.0  93.7   MCH  30.6  30.6   MCHC  33.3*  32.7*   RDW  42.7  44.7   PLATELETCT  200  183   MPV  10.8  11.0     Recent Labs      04/16/18   1708  04/17/18   0357   SODIUM  142  137   POTASSIUM  3.7  4.2   CHLORIDE  108  105   CO2  27  29   GLUCOSE  100*  112*   BUN  16  14   CREATININE  1.04  1.07   CALCIUM  8.4*  8.1*     Recent Labs      04/16/18   1708   APTT  23.6*   INR  1.08                  Assessment/Plan     * Pathologic fracture of femur (CMS-HCC)- (present on admission)   Assessment & Plan    Patient w/ progressive pain R knee x 2 months  Swelling x 3 days  Acute pain, Fx  CT suspcious for path Fx  MRI pending  Pain meds changed        Elevated alkaline phosphatase level- (present on admission)   Assessment & Plan    -Highly concerning for possible primary bone cancer, no hypercalcemia or kidney failure on labs          Quality-Core Measures   Reviewed items::  Labs reviewed and Medications reviewed  Pride catheter::  No Pride  DVT prophylaxis pharmacological::  Enoxaparin (Lovenox)  Ulcer Prophylaxis::  Not indicated  Assessed for rehabilitation services:  Patient unable to tolerate rehabilitation  therapeutic regimen

## 2018-04-17 NOTE — PROGRESS NOTES
Pt. Straight cathed per order, 1800 urine return. Will continue to monitor for voiding difficulty.

## 2018-04-17 NOTE — DISCHARGE PLANNING
Anticipated Discharge Disposition: [facility]/tertiary care     Action: referred to transfer center, left msg at [facility] .     Barriers to Discharge: bed at [facility]/approval from halfway for transfer    Plan: cancer center

## 2018-04-17 NOTE — PROGRESS NOTES
2  RN skin check done with Valery Shetty.  LLE old burn.  LLE knee bruise.  Right foot bruising.  No other skin concerns.

## 2018-04-17 NOTE — CONSULTS
"Date of Service:  4/16/2018    PCP: Pcp Pt States None    CC:  Right thigh pain    HPI: This is a 37 y.o. male who was transferred from [facility] with a right femur fracture.  He is a prisoner.  He states he was on the top bunk eating lunch & went to cross his legs.  He felt a snap & had immediate severe pain in that leg.  Unable to weightbear.  Denies falling out of the bunk or other more traumatic event.  Pretty clear on events.  States he did have knee pain for the past 2 months that initially got better, then worsened several weeks ago after running outside.  He was seen by a physician for this and diagnosed with a possible meniscal tear.  No xrays were done.  In the several days leading up to this event, he reported a \"knot\" over the anterior distal thigh & increased pain.  Denies history of cancer.    ROS: As above. The remainder of a complete review of systems is negative in all systems except as noted.    PMHx:  Active Ambulatory Problems     Diagnosis Date Noted   • No Active Ambulatory Problems     Resolved Ambulatory Problems     Diagnosis Date Noted   • No Resolved Ambulatory Problems     No Additional Past Medical History       SHx:  Social History     Social History   • Marital status: Single     Spouse name: N/A   • Number of children: N/A   • Years of education: N/A     Occupational History   • Not on file.     Social History Main Topics   • Smoking status: Never Smoker   • Smokeless tobacco: Never Used   • Alcohol use No   • Drug use: No   • Sexual activity: Not on file     Other Topics Concern   • Not on file     Social History Narrative   • No narrative on file       FHx:  family history is not on file.    Allergies:  No Known Allergies    Medications:  No current facility-administered medications on file prior to encounter.      No current outpatient prescriptions on file prior to encounter.       Objective Exam:  Vitals:    04/16/18 1553 04/16/18 1554 04/16/18 1701 04/16/18 1730   BP:  146/81   " "  Pulse:  69 85 90   Resp:  18 17 18   Temp:  36.8 °C (98.2 °F)     SpO2:  96% 95% 92%   Weight: 83.9 kg (185 lb)      Height: 2.108 m (6' 11\")          General: alert & oriented.  Comfortable.  HEENT: Atraumatic, neck supple, mucous membranes moist  Chest: nonlabored breathing, no audible wheezing  CV: regular rate, rhythm  Abd: soft, nontender  Ext: right lower extremity - pain in thigh with any attempts to move the leg.  Swelling present.  Able to flex and extend ankle, toes.  Foot warm, well-perfused with palpable dorsalis pedis pulse  Neuro: sensation preserved over right foot  Skin: intact    Laboratory--reviewed personally and are as follows:  Lab Results   Component Value Date/Time    WBC 10.1 04/16/2018 05:08 PM    RBC 4.74 04/16/2018 05:08 PM    HEMOGLOBIN 14.5 04/16/2018 05:08 PM    HEMATOCRIT 43.6 04/16/2018 05:08 PM    MCV 92.0 04/16/2018 05:08 PM    MCH 30.6 04/16/2018 05:08 PM    MCHC 33.3 (L) 04/16/2018 05:08 PM    MPV 10.8 04/16/2018 05:08 PM    NEUTSPOLYS 75.80 (H) 04/16/2018 05:08 PM    LYMPHOCYTES 16.80 (L) 04/16/2018 05:08 PM    MONOCYTES 6.10 04/16/2018 05:08 PM    EOSINOPHILS 0.80 04/16/2018 05:08 PM    BASOPHILS 0.20 04/16/2018 05:08 PM      No results found for: SODIUM, POTASSIUM, CHLORIDE, CO2, GLUCOSE, BUN, CREATININE, BUNCREATRAT, GLOMRATE   Lab Results   Component Value Date/Time    PROTHROMBTM 13.7 04/16/2018 05:08 PM    INR 1.08 04/16/2018 05:08 PM        Radiology  2 views right femur - distal third extra-articular femur fracture.  Evidence of possible permeative destructive lesion suspicious for pathologic process    Assessment:  Right femur fracture, possibly pathologic    Plan:  - Radiographs, mechanism of injury, and history are suspicious for pathologic fracture.  Will hold off on operative treatment at this point pending further work-up.  Would obtain CT R femur. If suspicious lesion is present on CT, would need to arrange for transfer to a tumor center for management.  - NWB " RLE in splint.  He is comfortable in what he came in, would leave him in that  - NPO p MN, possibly OR tomorrow pending CT

## 2018-04-17 NOTE — PROGRESS NOTES
15 lbs bucks traction has been applied and fitted to pt's R LE. Pt presented positive CMS both pre and post application.

## 2018-04-17 NOTE — DISCHARGE PLANNING
Anticipated Discharge Disposition: Tertiary care center    Action: [facility] has declined, they don't take inmates.Discussed with Dr. Baker, directed to refer to [facility]. Notified tx center    Barriers to Discharge: incarcerated     [facility]

## 2018-04-17 NOTE — PROGRESS NOTES
Received report from ER nurse and assumed care upon arrival to the floor. Patient is A+Ox4, pleasant and cooperative. He is a prisoner from [place]. He has 3 guards present with him at all times. He sustained a right femur fx earlier today just from reaching for something in his top bunk when he felt a snap. Workup being done to r/o abnormal pathology of the bone. MRI to be done in the morning. Regular diet until NP at [facility] in case of surgical intervention. Patient medicated for pain throughout shift. RLE in splint. No other concerns at this time. Bed is locked and in lowest position. He is immobile and NWB to RLE. Call light and belongings within reach. Hourly rounding in place.

## 2018-04-18 ENCOUNTER — APPOINTMENT (OUTPATIENT)
Dept: RADIOLOGY | Facility: MEDICAL CENTER | Age: 38
DRG: 544 | End: 2018-04-18
Attending: ORTHOPAEDIC SURGERY
Payer: MEDICAID

## 2018-04-18 ENCOUNTER — APPOINTMENT (OUTPATIENT)
Dept: RADIOLOGY | Facility: MEDICAL CENTER | Age: 38
DRG: 544 | End: 2018-04-18
Attending: INTERNAL MEDICINE
Payer: MEDICAID

## 2018-04-18 PROBLEM — N34.2 URETHRITIS: Status: ACTIVE | Noted: 2018-04-18

## 2018-04-18 PROBLEM — R33.9 URINARY RETENTION: Status: ACTIVE | Noted: 2018-04-18

## 2018-04-18 PROCEDURE — 51798 US URINE CAPACITY MEASURE: CPT

## 2018-04-18 PROCEDURE — 700111 HCHG RX REV CODE 636 W/ 250 OVERRIDE (IP): Performed by: INTERNAL MEDICINE

## 2018-04-18 PROCEDURE — 700102 HCHG RX REV CODE 250 W/ 637 OVERRIDE(OP): Performed by: ORTHOPAEDIC SURGERY

## 2018-04-18 PROCEDURE — 770001 HCHG ROOM/CARE - MED/SURG/GYN PRIV*

## 2018-04-18 PROCEDURE — 73720 MRI LWR EXTREMITY W/O&W/DYE: CPT | Mod: RT

## 2018-04-18 PROCEDURE — A9579 GAD-BASE MR CONTRAST NOS,1ML: HCPCS | Performed by: ORTHOPAEDIC SURGERY

## 2018-04-18 PROCEDURE — 700102 HCHG RX REV CODE 250 W/ 637 OVERRIDE(OP): Performed by: INTERNAL MEDICINE

## 2018-04-18 PROCEDURE — 72158 MRI LUMBAR SPINE W/O & W/DYE: CPT

## 2018-04-18 PROCEDURE — 99231 SBSQ HOSP IP/OBS SF/LOW 25: CPT | Performed by: INTERNAL MEDICINE

## 2018-04-18 PROCEDURE — 700117 HCHG RX CONTRAST REV CODE 255: Performed by: ORTHOPAEDIC SURGERY

## 2018-04-18 PROCEDURE — A9270 NON-COVERED ITEM OR SERVICE: HCPCS | Performed by: INTERNAL MEDICINE

## 2018-04-18 PROCEDURE — A9270 NON-COVERED ITEM OR SERVICE: HCPCS | Performed by: ORTHOPAEDIC SURGERY

## 2018-04-18 PROCEDURE — 87591 N.GONORRHOEAE DNA AMP PROB: CPT

## 2018-04-18 PROCEDURE — 87491 CHLMYD TRACH DNA AMP PROBE: CPT

## 2018-04-18 RX ADMIN — MORPHINE SULFATE 4 MG: 4 INJECTION INTRAVENOUS at 13:34

## 2018-04-18 RX ADMIN — GADODIAMIDE 20 ML: 287 INJECTION INTRAVENOUS at 14:49

## 2018-04-18 RX ADMIN — ACETAMINOPHEN 1000 MG: 500 TABLET ORAL at 11:16

## 2018-04-18 RX ADMIN — DIAZEPAM 2 MG: 2 TABLET ORAL at 20:43

## 2018-04-18 RX ADMIN — OXYCODONE HYDROCHLORIDE 10 MG: 10 TABLET ORAL at 14:24

## 2018-04-18 RX ADMIN — ACETAMINOPHEN 1000 MG: 500 TABLET ORAL at 17:50

## 2018-04-18 RX ADMIN — MORPHINE SULFATE 4 MG: 4 INJECTION INTRAVENOUS at 17:50

## 2018-04-18 RX ADMIN — SENNOSIDES AND DOCUSATE SODIUM 2 TABLET: 8.6; 5 TABLET ORAL at 20:43

## 2018-04-18 RX ADMIN — OXYCODONE HYDROCHLORIDE 10 MG: 10 TABLET ORAL at 02:06

## 2018-04-18 RX ADMIN — OXYCODONE HYDROCHLORIDE 10 MG: 10 TABLET ORAL at 23:54

## 2018-04-18 RX ADMIN — ENOXAPARIN SODIUM 40 MG: 100 INJECTION SUBCUTANEOUS at 08:07

## 2018-04-18 RX ADMIN — MORPHINE SULFATE 4 MG: 4 INJECTION INTRAVENOUS at 08:07

## 2018-04-18 RX ADMIN — ACETAMINOPHEN 1000 MG: 500 TABLET ORAL at 02:06

## 2018-04-18 RX ADMIN — ONDANSETRON HYDROCHLORIDE 4 MG: 2 INJECTION, SOLUTION INTRAMUSCULAR; INTRAVENOUS at 14:22

## 2018-04-18 RX ADMIN — OXYCODONE HYDROCHLORIDE 10 MG: 10 TABLET, FILM COATED, EXTENDED RELEASE ORAL at 08:06

## 2018-04-18 RX ADMIN — MORPHINE SULFATE 4 MG: 4 INJECTION INTRAVENOUS at 11:16

## 2018-04-18 RX ADMIN — OXYCODONE HYDROCHLORIDE 10 MG: 10 TABLET ORAL at 06:10

## 2018-04-18 RX ADMIN — MORPHINE SULFATE 4 MG: 4 INJECTION INTRAVENOUS at 21:49

## 2018-04-18 RX ADMIN — MORPHINE SULFATE 4 MG: 4 INJECTION INTRAVENOUS at 04:28

## 2018-04-18 RX ADMIN — OXYCODONE HYDROCHLORIDE 10 MG: 10 TABLET, FILM COATED, EXTENDED RELEASE ORAL at 20:43

## 2018-04-18 RX ADMIN — ACETAMINOPHEN 1000 MG: 500 TABLET ORAL at 23:53

## 2018-04-18 ASSESSMENT — ENCOUNTER SYMPTOMS
WEIGHT LOSS: 0
CHILLS: 0
SHORTNESS OF BREATH: 0
NAUSEA: 0
NERVOUS/ANXIOUS: 0
ABDOMINAL PAIN: 0
FEVER: 0
DEPRESSION: 0
VOMITING: 0

## 2018-04-18 ASSESSMENT — PAIN SCALES - GENERAL
PAINLEVEL_OUTOF10: 10
PAINLEVEL_OUTOF10: 7
PAINLEVEL_OUTOF10: 9
PAINLEVEL_OUTOF10: 8
PAINLEVEL_OUTOF10: 10
PAINLEVEL_OUTOF10: 8

## 2018-04-18 NOTE — ASSESSMENT & PLAN NOTE
Persistent retention 2/2 unclear mechanism   Requiring estrada  On cardura  Will need op urology evaluation

## 2018-04-18 NOTE — PROGRESS NOTES
MRI called stating pt was nauseous and complaining of 10/10 pain, zofran and oxy 10mg given by this RN in MRI.

## 2018-04-18 NOTE — DISCHARGE PLANNING
Received call from Maximo at [facility]. He requested records as he is working on transfer. Faxed transfer summary to .

## 2018-04-18 NOTE — PROGRESS NOTES
[facility] Hospitalist Progress Note    Date of Service: 2018    Chief Complaint  37 y.o. Previously healthy male prisoner admitted 2018 with 2 month h/o r knee pain and sudden increased pain with pathologic fracture of distal R femur.    Interval Problem Update  Patient is in better spirits today, more comfortable w/o traction. Had another bladder scan > 1600last PM and estrada placed. [facility] has Ortho Onc but no beds currently. MRI's done but results pending. Discussed w/ Ortho fellow and case management.    Consultants/Specialty  Orthopaedics    Disposition  Referral center        Review of Systems   Constitutional: Negative for chills, fever and weight loss.   Respiratory: Negative for shortness of breath.    Cardiovascular: Negative for chest pain.   Gastrointestinal: Negative for abdominal pain, nausea and vomiting.   Genitourinary: Negative for dysuria.        Retention, urethral dc noted by RN today   Musculoskeletal: Positive for joint pain.   Psychiatric/Behavioral: Negative for depression and suicidal ideas. The patient is not nervous/anxious.       Physical Exam  Laboratory/Imaging   Hemodynamics  Temp (24hrs), Av.9 °C (98.4 °F), Min:36.8 °C (98.2 °F), Max:36.9 °C (98.5 °F)   Temperature: 36.9 °C (98.5 °F)  Pulse  Av.6  Min: 62  Max: 95    Blood Pressure: 130/92      Respiratory      Respiration: 17, Pulse Oximetry: 96 %             Fluids    Intake/Output Summary (Last 24 hours) at 18 1622  Last data filed at 18 1200   Gross per 24 hour   Intake              600 ml   Output             2525 ml   Net            -1925 ml       Nutrition  Orders Placed This Encounter   Procedures   • DIET ORDER     Standing Status:   Standing     Number of Occurrences:   1     Order Specific Question:   Diet:     Answer:   Regular [1]     Physical Exam   Constitutional: He is oriented to person, place, and time. He appears well-developed and well-nourished. He appears distressed.   HENT:   Head:  Normocephalic and atraumatic.   Eyes: EOM are normal. Pupils are equal, round, and reactive to light. Right eye exhibits no discharge. Left eye exhibits no discharge.   Neck: Neck supple.   Cardiovascular: Normal rate and regular rhythm.    Pulmonary/Chest: Effort normal and breath sounds normal.   Abdominal: Soft. Bowel sounds are normal.   Genitourinary:   Genitourinary Comments: estrada   Musculoskeletal: He exhibits edema, tenderness and deformity.   Distal R thigh swelling   Neurological: He is alert and oriented to person, place, and time. No cranial nerve deficit.   Skin: Skin is warm and dry. He is not diaphoretic.   Numerous tattoos   Psychiatric: He has a normal mood and affect.   Nursing note and vitals reviewed.      Recent Labs      04/16/18   1708  04/17/18   0357   WBC  10.1  8.8   RBC  4.74  4.28*   HEMOGLOBIN  14.5  13.1*   HEMATOCRIT  43.6  40.1*   MCV  92.0  93.7   MCH  30.6  30.6   MCHC  33.3*  32.7*   RDW  42.7  44.7   PLATELETCT  200  183   MPV  10.8  11.0     Recent Labs      04/16/18   1708  04/17/18   0357   SODIUM  142  137   POTASSIUM  3.7  4.2   CHLORIDE  108  105   CO2  27  29   GLUCOSE  100*  112*   BUN  16  14   CREATININE  1.04  1.07   CALCIUM  8.4*  8.1*     Recent Labs      04/16/18   1708   APTT  23.6*   INR  1.08                  Assessment/Plan     * Pathologic fracture of femur (CMS-HCC)- (present on admission)   Assessment & Plan    Patient w/ progressive pain R knee x 2 months  Swelling x 3 days  Acute pain, Fx  CT suspcious for path Fx  MRI pending  Pain improved w/o traction  Plan Xfer [facility] when bed available (currently none)        Urinary retention   Assessment & Plan    R/o spine process  MRI lumbar pending  Estrada in place  (2X retention> 1600cc)        Elevated alkaline phosphatase level- (present on admission)   Assessment & Plan    -Highly concerning for possible primary bone cancer, no hypercalcemia or kidney failure on labs          Quality-Core Measures   Reviewed  items::  Labs reviewed and Medications reviewed  Pride catheter::  Urinary Tract Retention or Urinary Tract Obstruction  DVT prophylaxis pharmacological::  Enoxaparin (Lovenox)  Ulcer Prophylaxis::  Not indicated  Assessed for rehabilitation services:  Patient unable to tolerate rehabilitation therapeutic regimen

## 2018-04-18 NOTE — PROGRESS NOTES
Pt complaining of purulent discharge at urethral opening, MD Baker notified received orders to obtain chlamydia culture. Pt off unit at this time, will obtain swab when pt returns.

## 2018-04-18 NOTE — DIETARY
Nutrition Services:  BMI <19 (= 18.88)    Pt screened for low BMI. Pt is currently on a Regular diet and per chart pt PO %. Ht: 210.8 cm, Wt: 83.9 kg, BMI 18.88.  Added high protein snacks TID to encourage PO and ensure adequate intake.  Consult RD as needed. RD will re-screen weekly.      RD available PRN.

## 2018-04-18 NOTE — PROGRESS NOTES
Pt keeps retaining urine, bladder scans continuously show 999mL+. Explanation given to pt, pt states understanding. Pride placed.

## 2018-04-18 NOTE — PROGRESS NOTES
Seen & examined  Pain controlled  Pride placed for retention yesterday  MRI pending  Arranging transfer to [facility]    Vitals:    04/17/18 1600 04/17/18 2000 04/18/18 0400 04/18/18 0800   BP: 135/82 155/95 148/95 130/92   Pulse: 79 75 74 95   Resp: 17 17 17 17   Temp: 37 °C (98.6 °F) 36.9 °C (98.4 °F) 36.8 °C (98.2 °F) 36.9 °C (98.5 °F)   SpO2: 96% 95% 96% 96%   Weight:       Height:         RLE:  Knee immobilizer in place  Skin intact  F/e ankle, toes  Foot w/w/p    R distal femur fracture, likely pathologic    - NWB RLE.  Knee immobilizer.  Pain worsened with traction yesterday  - pain control  - Awaiting MRI   - Pending transfer to [facility] for orthopaedic oncology. Would not obtain biopsy of the lesion or other surgical intervention here.

## 2018-04-18 NOTE — DISCHARGE PLANNING
Spoke to BARBY Ledesma, he directed to refer to [facility]. Called transfer center, they will make referral.

## 2018-04-18 NOTE — DISCHARGE PLANNING
1217: Cathline called stating that [facility] declines at this time due to the lack of beds.     Left message for floor CM/SW to call back with update.

## 2018-04-18 NOTE — DISCHARGE PLANNING
Anticipated Discharge Disposition: Cancer Center    Action: Spoke to Maximo at [facility] . He will check into facilities for treatment and call me back.     Barriers to Discharge: incarcerated.    Plan: Cancer treatment center.

## 2018-04-18 NOTE — DISCHARGE PLANNING
1206: Called  [facility] [phone] and spoke with Cathline. Transfer request initiated and given patient information. States she will call back when she needs records.

## 2018-04-18 NOTE — CARE PLAN
Problem: Communication  Goal: The ability to communicate needs accurately and effectively will improve    Intervention: Educate patient and significant other/support system about the plan of care, procedures, treatments, medications and allow for questions  Pt educated on POC including estrada placement and MRI plans.       Problem: Pain Management  Goal: Pain level will decrease to patient's comfort goal    Intervention: Follow pain managment plan developed in collaboration with patient and Interdisciplinary Team  Pain managed per MAR.

## 2018-04-18 NOTE — DISCHARGE PLANNING
Anticipated Discharge Disposition: Tertiary care/cancer specialty    Action: Called [facility], left message requesting call back to discuss which facility to refer to    Barriers to Discharge:Incarcerated pt needing cancer specialty center    Plan: Cancer inpt f/u.

## 2018-04-19 LAB
C TRACH DNA SPEC QL NAA+PROBE: NEGATIVE
CRP SERPL HS-MCNC: 9.78 MG/DL (ref 0–0.75)
ERYTHROCYTE [SEDIMENTATION RATE] IN BLOOD BY WESTERGREN METHOD: 32 MM/HOUR (ref 0–15)
HIV 1+2 AB+HIV1 P24 AG SERPL QL IA: NON REACTIVE
N GONORRHOEA DNA SPEC QL NAA+PROBE: NEGATIVE
SPECIMEN SOURCE: NORMAL
TREPONEMA PALLIDUM IGG+IGM AB [PRESENCE] IN SERUM OR PLASMA BY IMMUNOASSAY: NON REACTIVE

## 2018-04-19 PROCEDURE — 770001 HCHG ROOM/CARE - MED/SURG/GYN PRIV*

## 2018-04-19 PROCEDURE — A9270 NON-COVERED ITEM OR SERVICE: HCPCS | Performed by: INTERNAL MEDICINE

## 2018-04-19 PROCEDURE — 36415 COLL VENOUS BLD VENIPUNCTURE: CPT

## 2018-04-19 PROCEDURE — 87389 HIV-1 AG W/HIV-1&-2 AB AG IA: CPT

## 2018-04-19 PROCEDURE — 700102 HCHG RX REV CODE 250 W/ 637 OVERRIDE(OP): Performed by: INTERNAL MEDICINE

## 2018-04-19 PROCEDURE — 700111 HCHG RX REV CODE 636 W/ 250 OVERRIDE (IP): Performed by: INTERNAL MEDICINE

## 2018-04-19 PROCEDURE — A9270 NON-COVERED ITEM OR SERVICE: HCPCS | Performed by: ORTHOPAEDIC SURGERY

## 2018-04-19 PROCEDURE — 87040 BLOOD CULTURE FOR BACTERIA: CPT

## 2018-04-19 PROCEDURE — 700102 HCHG RX REV CODE 250 W/ 637 OVERRIDE(OP): Performed by: ORTHOPAEDIC SURGERY

## 2018-04-19 PROCEDURE — 86140 C-REACTIVE PROTEIN: CPT

## 2018-04-19 PROCEDURE — 85652 RBC SED RATE AUTOMATED: CPT

## 2018-04-19 PROCEDURE — 86780 TREPONEMA PALLIDUM: CPT

## 2018-04-19 PROCEDURE — 99231 SBSQ HOSP IP/OBS SF/LOW 25: CPT | Performed by: INTERNAL MEDICINE

## 2018-04-19 RX ORDER — DOXYCYCLINE 100 MG/1
100 TABLET ORAL EVERY 12 HOURS
Status: DISCONTINUED | OUTPATIENT
Start: 2018-04-19 | End: 2018-04-23

## 2018-04-19 RX ORDER — CEFDINIR 300 MG/1
300 CAPSULE ORAL EVERY 12 HOURS
Status: DISCONTINUED | OUTPATIENT
Start: 2018-04-19 | End: 2018-04-23

## 2018-04-19 RX ADMIN — CEFDINIR 300 MG: 300 CAPSULE ORAL at 19:58

## 2018-04-19 RX ADMIN — MORPHINE SULFATE 4 MG: 4 INJECTION INTRAVENOUS at 15:35

## 2018-04-19 RX ADMIN — MORPHINE SULFATE 4 MG: 4 INJECTION INTRAVENOUS at 00:35

## 2018-04-19 RX ADMIN — DIAZEPAM 2 MG: 2 TABLET ORAL at 03:58

## 2018-04-19 RX ADMIN — DOXYCYCLINE 100 MG: 100 TABLET ORAL at 10:59

## 2018-04-19 RX ADMIN — OXYCODONE HYDROCHLORIDE 10 MG: 10 TABLET ORAL at 08:14

## 2018-04-19 RX ADMIN — MORPHINE SULFATE 4 MG: 4 INJECTION INTRAVENOUS at 10:59

## 2018-04-19 RX ADMIN — MORPHINE SULFATE 4 MG: 4 INJECTION INTRAVENOUS at 22:57

## 2018-04-19 RX ADMIN — DIAZEPAM 2 MG: 2 TABLET ORAL at 19:59

## 2018-04-19 RX ADMIN — ENOXAPARIN SODIUM 40 MG: 100 INJECTION SUBCUTANEOUS at 08:13

## 2018-04-19 RX ADMIN — ACETAMINOPHEN 1000 MG: 500 TABLET ORAL at 10:59

## 2018-04-19 RX ADMIN — MORPHINE SULFATE 4 MG: 4 INJECTION INTRAVENOUS at 20:01

## 2018-04-19 RX ADMIN — DOXYCYCLINE 100 MG: 100 TABLET ORAL at 19:59

## 2018-04-19 RX ADMIN — OXYCODONE HYDROCHLORIDE 10 MG: 10 TABLET ORAL at 18:20

## 2018-04-19 RX ADMIN — ACETAMINOPHEN 1000 MG: 500 TABLET ORAL at 18:21

## 2018-04-19 RX ADMIN — OXYCODONE HYDROCHLORIDE 10 MG: 10 TABLET ORAL at 03:58

## 2018-04-19 RX ADMIN — OXYCODONE HYDROCHLORIDE 10 MG: 10 TABLET, FILM COATED, EXTENDED RELEASE ORAL at 08:14

## 2018-04-19 RX ADMIN — OXYCODONE HYDROCHLORIDE 10 MG: 10 TABLET ORAL at 13:19

## 2018-04-19 RX ADMIN — OXYCODONE HYDROCHLORIDE 10 MG: 10 TABLET, FILM COATED, EXTENDED RELEASE ORAL at 19:58

## 2018-04-19 RX ADMIN — ACETAMINOPHEN 650 MG: 325 TABLET, FILM COATED ORAL at 22:57

## 2018-04-19 RX ADMIN — CEFDINIR 300 MG: 300 CAPSULE ORAL at 13:19

## 2018-04-19 RX ADMIN — CEFTRIAXONE 2 G: 2 INJECTION, POWDER, FOR SOLUTION INTRAMUSCULAR; INTRAVENOUS at 11:00

## 2018-04-19 ASSESSMENT — ENCOUNTER SYMPTOMS
VOMITING: 0
DEPRESSION: 0
ABDOMINAL PAIN: 0
CHILLS: 0
WEIGHT LOSS: 0
FEVER: 0
NERVOUS/ANXIOUS: 1
SHORTNESS OF BREATH: 0
NAUSEA: 0

## 2018-04-19 ASSESSMENT — PAIN SCALES - GENERAL
PAINLEVEL_OUTOF10: 7
PAINLEVEL_OUTOF10: 8
PAINLEVEL_OUTOF10: 9
PAINLEVEL_OUTOF10: 8
PAINLEVEL_OUTOF10: 9
PAINLEVEL_OUTOF10: 9
PAINLEVEL_OUTOF10: 8
PAINLEVEL_OUTOF10: 9
PAINLEVEL_OUTOF10: 9

## 2018-04-19 NOTE — PROGRESS NOTES
Pt given Rocephin per MAR, after administrating medication this RN noticed redness following course of vein spread up pts arm. Pt has no complaints of pain or itching or other reaction. MD Cho notified. No new orders received.

## 2018-04-19 NOTE — CONSULTS
DATE OF SERVICE:  04/19/2018    REASON FOR CONSULT:  Urethritis and pathologic bone fracture.    CONSULTING PHYSICIAN:  Latia Baker MD    HISTORY OF PRESENT ILLNESS:  This is a 37-year-old male with no significant   past medical history, who was initially admitted to the hospital on 04/16/2018   due to sudden pain, swelling and fracture of his right femur.  The patient   states he was in his good health until approximately 1 month prior to   admission.  He started developing mild swelling, but severe pain in his knee.    He was seen and evaluated by a physician there and was supposed to receive   indomethacin; however, never received it.  He continued to develop progressive   pain and increased swelling just superior to his knee, with minimal   improvement with nonsteroidals.  He states he had continued to exercise until   about the day prior to admission.  He had decided not to exercise due to pain   in the knee, so he was lying in bed cross legged, reading and then when he   went to grab a book, he felt a sharp pain and sudden popping and cracking   sensation above his right knee.  X-ray showed a distal femur fracture and is   transferred to [facility] for further evaluation and management.  Pain   improved once he received morphine; however, he developed urinary retention.    Bladder scan revealed about 800 mL.  He underwent 2 straight caths and due to   inability to urinate, a Pride catheter was placed.  Subsequent to Pride   catheter placement, he developed what he describes as some greenish discharge.    He had no similar episodes in the past.  He states he has not had sex at all   in many weeks.  He has no history of similar episodes in the past.  No   history of sexually transmitted diseases.  He denies any history of   unexplained weight loss, night sweats.  He denies any current fever, chills,   nausea, vomiting or diarrhea.  MRI is suggestive of neoplastic process in his   femur.    REVIEW  "OF SYSTEMS:  All other systems reviewed are negative.    ALLERGIES:  He has no known antibiotic allergies.    FAMILY HISTORY:  Uterine cancer in his grandmother, diabetes in his   grandfather.    SOCIAL HISTORY:  He does not currently smoke, drink, or use illicit drugs.  He   is currently in detention.    PHYSICAL EXAM:  Blood pressure 141/84, pulse 96, temperature 36.7 °C (98.1 °F), resp. rate 16, height 2.108 m (6' 11\"), weight 83.9 kg (185 lb), SpO2 100 %.  General:  Well nourished, well developed male in NAD. Pleasant and cooperative  Head: NCAT, Pupils are equal round reactive to light. Extraocular movements intact. Oropharynx is clear.  Neck: Supple.  No LAD  Pulmonary: Clear to ausculation.  No rales, rhonchi, or wheezing. Unlabored  Cardiovascular: Regular rate and rhythm without murmur. No ectopy  Abdominal: Soft, nontender, nondistended. Positive bowel sounds. No hepatosplenomegaly. Pride  Skin: No rashes. Extensive tattoos  Extremities: no clubbing, cyanosis RLE in soft brace  Neurologic: Alert and oriented x4. Speech is fluent without dysarthria. Cranial nerves intact. Moving all extremities.     DIAGNOSTIC DATA:  Current labs show white blood cell count of 8.8, H and H of   13.1 and 40.1, platelets of 183.  Sodium 137, potassium 4.2, chloride 103,   bicarbonate 29, glucose 112, BUN 14, creatinine of 1.  AST 32, ALT 26,   alkaline phosphatase of 507, albumin of 3.8.  GC is pending.  MRI of the   lumbar spine shows no abnormalities.  MRI of the femur shows the distal   femoral diaphyseal fracture with impaction, displacement and communication,   intramedullary marrow alteration which fills the marrow cavity at the fracture   site with a secondary lesion also involving the distal femur, extensive edema   and enhancement of the soft tissues surrounding the fracture site.    ASSESSMENT AND PLAN:  A 37-year-old male with no significant past medical   history admitted to the hospital with pathologic fracture of the " right femur.    Given the patient's history, this is most concerning for a neoplastic process   such as sarcoma or multiple myeloma.  Less concern for infectious process   given no history of traumatic injury or systemic illness.  He is planned for   transfer to a tertiary oncology center for biopsy and further evaluation and   management.  For his urethral discharge, history is most consistent with   actually traumatic injury from the straight cathing with possible bacterial   superinfection, most likely with enteric pathogens.  I do, however, agree with   checking for gonorrhea and chlamydia.  We will also check HIV and RPR for   completeness.  He was given a dose of ceftriaxone; however, there is visible   irritation to the veins, so switch to oral Cefdinir along with doxycycline   pending further results.  Discussed with internal medicine.    Thank you and we will follow with you.       ____________________________________     MD WESTON JEFFREY / FROYLAN    DD:  04/19/2018 11:30:57  DT:  04/19/2018 12:17:20    D#:  [id number]  Job#:  [id number]

## 2018-04-19 NOTE — DISCHARGE PLANNING
Received call from Lorenzo FIELD for [facility] . They are continuing to look for facility to meet pt's needs. Farhana Art is Chief of Nursing for [facility]  and is involved in this case.

## 2018-04-19 NOTE — CARE PLAN
Problem: Infection  Goal: Will remain free from infection    Intervention: Assess signs and symptoms of infection  Assess for color changes at wound site , temperature difference , increased pain , increased drainage. Monitor labs for any indication of trending infections.ALL WDL // some discharge from the urethra , however cultures already obtained and pending results

## 2018-04-19 NOTE — PROGRESS NOTES
Seen & examined  Pain controlled  Complains of penile discharge, cultures sent  MRI done yesterday, marrow replacing lesion in distal femur    Vitals:    04/18/18 0800 04/18/18 1600 04/18/18 1900 04/19/18 0300   BP: 130/92 126/89 135/88 134/83   Pulse: 95 89 89 80   Resp: 17 17 18 16   Temp: 36.9 °C (98.5 °F) 36.6 °C (97.9 °F) 37.1 °C (98.8 °F) 36.6 °C (97.9 °F)   SpO2: 96% 95% 90% 94%   Weight:       Height:         RLE:  Knee immobilizer in place  Skin intact  F/e ankle, toes  Foot w/w/p    R pathologic distal femur fracture, MRI concerning for primary bone tumor    - NWB RLE.  Knee immobilizer  - pain control  - Lovenox  - Pending transfer to tertiary center for orthopaedic oncology. Would not obtain biopsy of the lesion or other surgical intervention here.

## 2018-04-19 NOTE — CARE PLAN
Problem: Bowel/Gastric:  Goal: Normal bowel function is maintained or improved    Intervention: Educate patient and significant other/support system about diet, fluid intake, medications and activity to promote bowel function  Educated patient and encouraged adequate water intake to assist in ensuring bowel movement. Patient initially wanted to decline bowel medications , had a discussion about the potential complications of bowel obstructions as well as the nature of the opioid effects on the intestine. Patient accepted medication and verbalized understanding and purpose of bowel protocol

## 2018-04-19 NOTE — PROGRESS NOTES
[facility] Hospitalist Progress Note    Date of Service: 2018    Chief Complaint  37 y.o. Previously healthy male prisoner admitted 2018 with 2 month h/o r knee pain and sudden increased pain with pathologic fracture of distal R femur.    Interval Problem Update  Patient continues to hope that this is something other than malignancy.  ID consultation sought as infection is still listed in the differential-although very unlikely  Discussed with Dr. Cho-addressing urethritis but she does not feel that infection is a likely cause of patient's pathologic fracture. Serologic studies would also tend to support that he does not have osteomyelitis.  As yet we have no accepting facility for referral    Consultants/Specialty  Orthopaedics    Disposition  Referral center        Review of Systems   Constitutional: Negative for chills, fever and weight loss.   Respiratory: Negative for shortness of breath.    Cardiovascular: Negative for chest pain.   Gastrointestinal: Negative for abdominal pain, nausea and vomiting.   Genitourinary: Negative for dysuria.        Retention   Musculoskeletal: Positive for joint pain.   Psychiatric/Behavioral: Negative for depression and suicidal ideas. The patient is nervous/anxious.       Physical Exam  Laboratory/Imaging   Hemodynamics  Temp (24hrs), Av.8 °C (98.3 °F), Min:36.6 °C (97.9 °F), Max:37.1 °C (98.8 °F)   Temperature: 36.7 °C (98.1 °F)  Pulse  Av.5  Min: 62  Max: 96    Blood Pressure: 141/84      Respiratory      Respiration: 16, Pulse Oximetry: 100 %             Fluids    Intake/Output Summary (Last 24 hours) at 18 1627  Last data filed at 18 0041   Gross per 24 hour   Intake                0 ml   Output             2200 ml   Net            -2200 ml       Nutrition  Orders Placed This Encounter   Procedures   • DIET ORDER     Standing Status:   Standing     Number of Occurrences:   1     Order Specific Question:   Diet:     Answer:   Regular [1]      Physical Exam   Constitutional: He is oriented to person, place, and time. He appears well-developed and well-nourished.   HENT:   Head: Normocephalic and atraumatic.   Eyes: EOM are normal. Pupils are equal, round, and reactive to light. Right eye exhibits no discharge. Left eye exhibits no discharge.   Neck: Neck supple.   Cardiovascular: Normal rate and regular rhythm.    Pulmonary/Chest: Effort normal and breath sounds normal.   Abdominal: Soft. Bowel sounds are normal.   Genitourinary:   Genitourinary Comments: estrada   Musculoskeletal:   Knee in immobilizer   Neurological: He is alert and oriented to person, place, and time. No cranial nerve deficit.   Skin: Skin is warm and dry. He is not diaphoretic.   Numerous tattoos   Psychiatric: He has a normal mood and affect.   Nursing note and vitals reviewed.      Recent Labs      04/16/18   1708  04/17/18   0357   WBC  10.1  8.8   RBC  4.74  4.28*   HEMOGLOBIN  14.5  13.1*   HEMATOCRIT  43.6  40.1*   MCV  92.0  93.7   MCH  30.6  30.6   MCHC  33.3*  32.7*   RDW  42.7  44.7   PLATELETCT  200  183   MPV  10.8  11.0     Recent Labs      04/16/18   1708  04/17/18   0357   SODIUM  142  137   POTASSIUM  3.7  4.2   CHLORIDE  108  105   CO2  27  29   GLUCOSE  100*  112*   BUN  16  14   CREATININE  1.04  1.07   CALCIUM  8.4*  8.1*     Recent Labs      04/16/18   1708   APTT  23.6*   INR  1.08                  Assessment/Plan     * Pathologic fracture of femur (CMS-[facility])- (present on admission)   Assessment & Plan    Patient w/ progressive pain R knee x 2 months  Swelling x 3 days PTA  Acute pain, Fx  CT suspcious for path Fx, MRI same- ESR does not support Dx osteo  Plan Xfer [facility] when bed available (currently none)        Urethritis- (present on admission)   Assessment & Plan    Check GC, chalmydia- pending        Urinary retention- (present on admission)   Assessment & Plan    ? 2/2 urethritis- L spine OK  Treat urethritis        Elevated alkaline phosphatase level-  (present on admission)   Assessment & Plan    -Highly concerning for possible primary bone cancer, no hypercalcemia or kidney failure on labs          Quality-Core Measures   Reviewed items::  Labs reviewed and Medications reviewed  Pride catheter::  Urinary Tract Retention or Urinary Tract Obstruction  DVT prophylaxis pharmacological::  Enoxaparin (Lovenox)  Ulcer Prophylaxis::  Not indicated  Antibiotics:  Treating active infection/contamination beyond 24 hours perioperative coverage  Assessed for rehabilitation services:  Patient unable to tolerate rehabilitation therapeutic regimen

## 2018-04-19 NOTE — PROGRESS NOTES
Assumed Care at 1900  VSS  Complaints of pain at this time right knee  , will medicate as soon as PRN available , stating discomfort 8/10      Call light within reach.  Patient resting comfortably

## 2018-04-20 PROCEDURE — 700102 HCHG RX REV CODE 250 W/ 637 OVERRIDE(OP): Performed by: INTERNAL MEDICINE

## 2018-04-20 PROCEDURE — 99232 SBSQ HOSP IP/OBS MODERATE 35: CPT | Performed by: INTERNAL MEDICINE

## 2018-04-20 PROCEDURE — 700102 HCHG RX REV CODE 250 W/ 637 OVERRIDE(OP): Performed by: ORTHOPAEDIC SURGERY

## 2018-04-20 PROCEDURE — A9270 NON-COVERED ITEM OR SERVICE: HCPCS | Performed by: INTERNAL MEDICINE

## 2018-04-20 PROCEDURE — 700111 HCHG RX REV CODE 636 W/ 250 OVERRIDE (IP): Performed by: INTERNAL MEDICINE

## 2018-04-20 PROCEDURE — 700111 HCHG RX REV CODE 636 W/ 250 OVERRIDE (IP): Performed by: ORTHOPAEDIC SURGERY

## 2018-04-20 PROCEDURE — A9270 NON-COVERED ITEM OR SERVICE: HCPCS | Performed by: ORTHOPAEDIC SURGERY

## 2018-04-20 PROCEDURE — 770001 HCHG ROOM/CARE - MED/SURG/GYN PRIV*

## 2018-04-20 RX ADMIN — MORPHINE SULFATE 4 MG: 4 INJECTION INTRAVENOUS at 12:04

## 2018-04-20 RX ADMIN — ACETAMINOPHEN 1000 MG: 500 TABLET ORAL at 18:07

## 2018-04-20 RX ADMIN — OXYCODONE HYDROCHLORIDE 10 MG: 10 TABLET, FILM COATED, EXTENDED RELEASE ORAL at 09:23

## 2018-04-20 RX ADMIN — DIAZEPAM 2 MG: 2 TABLET ORAL at 11:10

## 2018-04-20 RX ADMIN — ENOXAPARIN SODIUM 40 MG: 100 INJECTION SUBCUTANEOUS at 09:23

## 2018-04-20 RX ADMIN — DOXYCYCLINE 100 MG: 100 TABLET ORAL at 21:11

## 2018-04-20 RX ADMIN — OXYCODONE HYDROCHLORIDE 10 MG: 10 TABLET ORAL at 03:14

## 2018-04-20 RX ADMIN — OXYCODONE HYDROCHLORIDE 10 MG: 10 TABLET ORAL at 09:23

## 2018-04-20 RX ADMIN — KETOROLAC TROMETHAMINE 30 MG: 30 INJECTION, SOLUTION INTRAMUSCULAR at 11:10

## 2018-04-20 RX ADMIN — ACETAMINOPHEN 1000 MG: 500 TABLET ORAL at 12:05

## 2018-04-20 RX ADMIN — KETOROLAC TROMETHAMINE 30 MG: 30 INJECTION, SOLUTION INTRAMUSCULAR at 17:16

## 2018-04-20 RX ADMIN — MORPHINE SULFATE 4 MG: 4 INJECTION INTRAVENOUS at 19:16

## 2018-04-20 RX ADMIN — OXYCODONE HYDROCHLORIDE 10 MG: 10 TABLET, FILM COATED, EXTENDED RELEASE ORAL at 21:11

## 2018-04-20 RX ADMIN — MORPHINE SULFATE 4 MG: 4 INJECTION INTRAVENOUS at 15:01

## 2018-04-20 RX ADMIN — DIAZEPAM 2 MG: 2 TABLET ORAL at 18:07

## 2018-04-20 RX ADMIN — DIAZEPAM 2 MG: 2 TABLET ORAL at 03:14

## 2018-04-20 RX ADMIN — DOXYCYCLINE 100 MG: 100 TABLET ORAL at 09:23

## 2018-04-20 RX ADMIN — OXYCODONE HYDROCHLORIDE 10 MG: 10 TABLET ORAL at 13:44

## 2018-04-20 RX ADMIN — SENNOSIDES AND DOCUSATE SODIUM 2 TABLET: 8.6; 5 TABLET ORAL at 09:22

## 2018-04-20 RX ADMIN — CEFDINIR 300 MG: 300 CAPSULE ORAL at 21:12

## 2018-04-20 RX ADMIN — OXYCODONE HYDROCHLORIDE 10 MG: 10 TABLET ORAL at 18:07

## 2018-04-20 RX ADMIN — MORPHINE SULFATE 4 MG: 4 INJECTION INTRAVENOUS at 22:51

## 2018-04-20 RX ADMIN — CEFDINIR 300 MG: 300 CAPSULE ORAL at 09:23

## 2018-04-20 RX ADMIN — MORPHINE SULFATE 4 MG: 4 INJECTION INTRAVENOUS at 07:52

## 2018-04-20 RX ADMIN — ACETAMINOPHEN 1000 MG: 500 TABLET ORAL at 05:49

## 2018-04-20 ASSESSMENT — ENCOUNTER SYMPTOMS
DEPRESSION: 0
SHORTNESS OF BREATH: 0
NERVOUS/ANXIOUS: 0
FEVER: 0
VOMITING: 0
WEIGHT LOSS: 0
ABDOMINAL PAIN: 0
CHILLS: 0
NAUSEA: 0

## 2018-04-20 ASSESSMENT — PAIN SCALES - GENERAL
PAINLEVEL_OUTOF10: 8
PAINLEVEL_OUTOF10: 9

## 2018-04-20 NOTE — DISCHARGE PLANNING
Anticipated Discharge Disposition: Transfer to cancer center.    Action: LSW called the transfer center for update and asked if [facility] referral could be sent.  [facility] is still waiting for [place] corrections to authorize a Cancer Center to send pt.  [facility] referral would be declined at this time.      Barriers to Discharge: Insurance and inmate at the correctional center.    Plan: LSW to follow the Transfer Center for updates and transfer time.

## 2018-04-20 NOTE — CARE PLAN
Problem: Pain Management  Goal: Pain level will decrease to patient's comfort goal    Intervention: Educate and implement non-pharmacologic comfort measures. Examples: relaxation, distration, play therapy, activity therapy, massage, etc.  Patient had an episode of extreme pain and muscle spasm in the right leg , used pharm interventions as well as massage and warm packs to decrease the pain in the muscle .

## 2018-04-20 NOTE — PROGRESS NOTES
Infectious Disease Progress Note    Author: Gianna Cho M.D. Date & Time of service: 2018  4:21 PM    Chief Complaint:  Urethristis    Interval History:  37 y.o. male prisoner admitted 2018 with 2 month h/o r knee pain and sudden increased pain with pathologic fracture of distal R femur +urethral discharge   AF sleeping soundly  Labs Reviewed, Medications Reviewed and Radiology Reviewed.    Review of Systems:  Review of Systems   Unable to perform ROS: Other   sleeping    Hemodynamics:  Temp (24hrs), Av.1 °C (98.7 °F), Min:36.9 °C (98.4 °F), Max:37.4 °C (99.3 °F)  Temperature: 36.9 °C (98.5 °F)  Pulse  Av.8  Min: 62  Max: 105   Blood Pressure: 128/89       Physical Exam:  Physical Exam   Constitutional: He appears well-developed. No distress.   HENT:   Head: Normocephalic and atraumatic.   Cardiovascular: Normal rate.    Pulmonary/Chest: Effort normal. No respiratory distress.   Abdominal: Soft.   Musculoskeletal: He exhibits no edema.   RLE brace   Neurological:   sleeping   Skin: No rash noted.   Nursing note and vitals reviewed.      Meds:    Current Facility-Administered Medications:   •  doxycycline monohydrate  •  cefdinir  •  morphine injection  •  oxyCODONE immediate-release  •  oxyCODONE immediate-release  •  ketorolac  •  enoxaparin (LOVENOX) injection  •  senna-docusate **AND** polyethylene glycol/lytes **AND** magnesium hydroxide **AND** bisacodyl  •  Respiratory Care per Protocol  •  acetaminophen  •  ondansetron  •  ondansetron  •  diazePAM  •  acetaminophen  •  oxyCODONE CR    Labs:  No results for input(s): WBC, RBC, HEMOGLOBIN, HEMATOCRIT, MCV, MCH, RDW, PLATELETCT, MPV, NEUTSPOLYS, LYMPHOCYTES, MONOCYTES, EOSINOPHILS, BASOPHILS, RBCMORPHOLO in the last 72 hours.  No results for input(s): SODIUM, POTASSIUM, CHLORIDE, CO2, GLUCOSE, BUN, CPKTOTAL in the last 72 hours.  No results for input(s): ALBUMIN, TBILIRUBIN, ALKPHOSPHAT, TOTPROTEIN, ALTSGPT, ASTSGOT, CREATININE in  the last 72 hours.    Imaging:  Ct-cta Lower Ext With & W/o-post Process Right    Result Date: 4/18/2018 4/17/2018 7:47 AM HISTORY/REASON FOR EXAM:  Atraumatic pain, swelling, deformity. Femur fracture. Possible underlying malignant lesion. TECHNIQUE/EXAM DESCRIPTION:  CT angiogram of the right lower extremity with contrast, with reconstructions. 100 mL of Omnipaque 350 nonionic contrast was administered at 5.0 mL/sec and helical scanning obtained from the distal femur through the ankle. Thin- and thick-section multiplanar volume reformats were generated from the axial data set in the sagittal and coronal planes. 3D angiographic images were reviewed on PACS. Maximum intensity projection (MIP) images were generated and reviewed. Low dose optimization technique was utilized for this CT exam including automated exposure control and adjustment of the mA and/or kV according to patient size. COMPARISON: None. FINDINGS: There is a comminuted multi fragmented spiral fracture through the distal diaphysis of the right femur. There is apex anterior angulation. There is 12.9 mm lateral offset of the proximal diaphyseal fragment. There is permeative destruction of the femoral cortices at the level of the fracture. Periosteal reaction and intramedullary lucency also demonstrated. No other fractures identified. No hip joint dislocation. Small knee joint effusion.     Comminuted angulated displaced distal right femoral fracture. Suspect underlying malignant bone lesion.    Ct-extremity, Lower W/o Left    Addendum Date: 4/18/2018    Addendum created on 4/27/2018: - [facility] Disregard this report as it was ordered incorrectly. Please refer to accession number [id number] for correct order and report.    Result Date: 4/18/2018 4/16/2018 5:58 PM HISTORY/REASON FOR EXAM:  Atraumatic Pain/Swelling/Deformity. Femur fracture. Possible underlying malignant lesion. TECHNIQUE/EXAM DESCRIPTION AND NUMBER OF VIEWS: CT scan of the right lower extremity  without contrast and including reconstructions. Thin-section noncontrast helical images were obtained. Coronal and sagittal reconstructions were generated from the axial images. Up to date radiation dose reduction adjustments have been utilized to meet ALARA standards for radiation dose reduction. COMPARISON: None. FINDINGS: There is a comminuted multi fragmented spiral fracture through the distal diaphysis of the right femur. There is apex anterior angulation. There is 12.9 mm lateral offset of the proximal diaphyseal fragment. There is permeative destruction of the femoral cortices at the level of the fracture. Periosteal reaction and intramedullary lucency also demonstrated. No other fractures identified. No hip joint dislocation. Small knee joint effusion.     Comminuted angulated displaced distal right femoral fracture. Suspect underlying malignant bone lesion.    Mr-femur-with & W/o Right    Result Date: 4/18/2018 4/18/2018 1:58 PM HISTORY/REASON FOR EXAM: Pathologic right femoral fracture. TECHNIQUE/EXAM DESCRIPTION: MRI of the RIGHT femur without and with contrast. The study was performed on a [employer] Signa 1.5 Diane MRI scanner. T1 sagittal, T1 coronal, T1 axial, T2 fat-suppressed axial and coronal, and fast spin-echo inversion recovery sagittal images were obtained of the femur pre-contrast followed by T1 axial fat suppressed images post intravenous administration of 18 mL Omniscan. COMPARISON: None. FINDINGS: Osseous structures/Cartilaginous surfaces: There is a mildly impacted and angulated fracture of the distal femoral diaphysis. At the fracture site, there is an area of marrow signal alteration which measures 10 cm in linear dimension and fills the marrow  cavity at that level. There is low T1 signal with bright T2 signal and some surrounding enhancement. There is a second lesion involving the distal femur anteriorly at and just above the trochlear groove anteriorly which measures 3.3 x 2.4 x 2.1 cm in  size. This has similar signal characteristics and there is surrounding marrow edema. Soft tissue structures: There is extensive edema involving the muscles of the lower leg with areas of enhancement and fluid within the surrounding soft tissues.     1.  Distal femoral diaphyseal fracture with impaction and some displacement and comminution. There is intramedullary marrow signal alteration which fills the marrow cavity at the fracture site over a linear dimension of 10 cm. There is a second lesion seen involving the distal femur anteriorly just above the trochlear groove which measures 3 cm in size. Due to the acute fracture, it is difficult to characterize this lesion due to the acute fracture however differential considerations include primary bone neoplasm, multiple myeloma, metastatic disease and infectious process. 2.  Extensive edema and enhancement of the soft tissues surrounding the fracture site and extending into the surrounding muscles with some areas of rim-enhancing fluid. This may be reactive change related to the fracture versus presence of an infectious process. Edema extends proximally along the course of the thigh muscles.    Mr-lumbar Spine-with & W/o    Result Date: 4/18/2018 4/18/2018 1:58 PM HISTORY/REASON FOR EXAM:  Urinary retention and leg pain. TECHNIQUE/EXAM DESCRIPTION: MRI of the lumbar spine without and with contrast. The study was performed on a [employer] Signa 1.5 Diane MRI scanner. T1 sagittal, T2 fast spin-echo sagittal, and T2 axial images were obtained of the lumbar spine. T1 post-contrast fat-suppressed sagittal images were obtained. 18 mL Omniscan contrast was administered intravenously. COMPARISON:  None. FINDINGS: Alignment in the lumbar spine is normal. Marrow signal in the vertebral bodies is within normal limits. There is no abnormal osseous enhancement. There is no other abnormal extradural enhancement. There are no significant osteophytic changes. The prevertebral and  "paraspinous soft tissues are unremarkable. The conus is normal in position and signal. There is no abnormal cord enhancement. At T12-L1 through L5-S1 disc height and signal is normal. At T12-L1 through L5-S1 there is no significant disc bulge or protrusion. There is no congenital or acquired central spinal stenosis at any lumbar level. The neural foramina are intact at all lumbar levels. There is no significant ligamentous or facet hypertrophy.     1. MRI OF THE LUMBAR SPINE WITHOUT AND WITH CONTRAST WITHIN NORMAL LIMITS.      Micro:  Results     Procedure Component Value Units Date/Time    BLOOD CULTURE [[id number]] Collected:  04/19/18 1003    Order Status:  Completed Specimen:  Blood from Peripheral Updated:  04/20/18 0916     Significant Indicator NEG     Source BLD     Site PERIPHERAL     Blood Culture No Growth    Note: Blood cultures are incubated for 5 days and  are monitored continuously.Positive blood cultures  are called to the RN and reported as soon as  they are identified.      Narrative:       Per Hospital Policy: Only change Specimen Src: to \"Line\" if  specified by physician order.    BLOOD CULTURE [[id number]] Collected:  04/19/18 1003    Order Status:  Completed Specimen:  Blood from Peripheral Updated:  04/20/18 0916     Significant Indicator NEG     Source BLD     Site PERIPHERAL     Blood Culture No Growth    Note: Blood cultures are incubated for 5 days and  are monitored continuously.Positive blood cultures  are called to the RN and reported as soon as  they are identified.      Narrative:       Per Hospital Policy: Only change Specimen Src: to \"Line\" if  specified by physician order.    CHLAMYDIA/GC PCR URINE OR SWAB [[id number]] Collected:  04/18/18 1750    Order Status:  Completed Specimen:  Urine from Genital Updated:  04/19/18 2236     Source GENITAL     C. trachomatis by PCR Negative     N. gonorrhoeae by PCR Negative    Narrative:       Collected By:[id number] CULLEN WATT" CHLAMYDIA/GC PCR URINE OR SWAB [[id number]] Collected:  04/18/18 9223    Order Status:  Canceled Specimen:  Urine from Genital           Assessment:  Active Hospital Problems    Diagnosis   • *Pathologic fracture of femur (CMS-HCC) [M84.453A]   • Urinary retention [R33.9]   • Urethritis [N34.2]   • Elevated alkaline phosphatase level [R74.8]       Plan:  Pathologic fracture of the right femur.   Afebrile  No leukocytosis  Plan transfer for diagnostic procedure    Urethritis  Likely traumatic  GC neg  HIV, RPR neg  Continue oral Cefdinirand doxycycline 7 days    Will sign off  Please reconsult if needed    Discussed with internal medicine.Dr Baker

## 2018-04-20 NOTE — PROGRESS NOTES
Seen & examined  Pain controlled  Awaiting transfer to [facility]    Vitals:    04/19/18 1600 04/19/18 1900 04/20/18 0300 04/20/18 0710   BP: 137/95 120/90 137/91 128/89   Pulse: 90 (!) 105 85 85   Resp: 17 18 16 16   Temp: 36.7 °C (98.1 °F) 37.4 °C (99.3 °F) 36.9 °C (98.4 °F) 36.9 °C (98.5 °F)   SpO2: 92% 97% 97% 95%   Weight:       Height:         RLE:  Knee immobilizer in place  Skin intact  F/e ankle, toes  Foot w/w/p    R pathologic distal femur fracture, MRI concerning for primary bone tumor    - NWB RLE.  Knee immobilizer  - pain control  - Lovenox  - Pending transfer to tertiary center for orthopaedic oncology. Would not obtain biopsy of the lesion or other surgical intervention here.

## 2018-04-20 NOTE — PROGRESS NOTES
[facility] Hospitalist Progress Note    Date of Service: 2018    Chief Complaint  37 y.o. Previously healthy male prisoner admitted 2018 with 2 month h/o r knee pain and sudden increased pain with pathologic fracture of distal R femur.    Interval Problem Update  Updated on care plan  As yet no accepting facilities  Will need to look at alternatives such as [facility] or [facility]    Consultants/Specialty  Orthopaedics    Disposition  Referral center        Review of Systems   Constitutional: Negative for chills, fever and weight loss.   Respiratory: Negative for shortness of breath.    Cardiovascular: Negative for chest pain.   Gastrointestinal: Negative for abdominal pain, nausea and vomiting.   Genitourinary: Negative for dysuria.        Has Pride, urethral discharge resolved   Musculoskeletal: Positive for joint pain.   Psychiatric/Behavioral: Negative for depression and suicidal ideas. The patient is not nervous/anxious.       Physical Exam  Laboratory/Imaging   Hemodynamics  Temp (24hrs), Av °C (98.6 °F), Min:36.7 °C (98.1 °F), Max:37.4 °C (99.3 °F)   Temperature: 36.9 °C (98.5 °F)  Pulse  Av.8  Min: 62  Max: 105    Blood Pressure: 128/89      Respiratory      Respiration: 16, Pulse Oximetry: 95 %             Fluids    Intake/Output Summary (Last 24 hours) at 18 1549  Last data filed at 18 1500   Gross per 24 hour   Intake             1760 ml   Output             4750 ml   Net            -2990 ml       Nutrition  Orders Placed This Encounter   Procedures   • DIET ORDER     Standing Status:   Standing     Number of Occurrences:   1     Order Specific Question:   Diet:     Answer:   Regular [1]     Physical Exam   Constitutional: He is oriented to person, place, and time. He appears well-developed and well-nourished.   HENT:   Head: Normocephalic and atraumatic.   Eyes: EOM are normal. Pupils are equal, round, and reactive to light. Right eye exhibits no discharge. Left eye exhibits no discharge.    Neck: Neck supple.   Cardiovascular: Normal rate and regular rhythm.    Pulmonary/Chest: Effort normal and breath sounds normal.   Abdominal: Soft. Bowel sounds are normal.   Genitourinary:   Genitourinary Comments: estrada   Musculoskeletal:   Knee in immobilizer   Neurological: He is alert and oriented to person, place, and time. No cranial nerve deficit.   Skin: Skin is warm and dry. He is not diaphoretic.   Numerous tattoos   Psychiatric: He has a normal mood and affect.   Nursing note and vitals reviewed.                               Assessment/Plan     * Pathologic fracture of femur (CMS-HCC)- (present on admission)   Assessment & Plan    Patient w/ progressive pain R knee x 2 months  Swelling x 3 days PTA  Acute pain, Fx  CT suspcious for path Fx, MRI same- ESR does not support Dx osteo  Plan Xfer [facility] when bed available (currently none)        Urethritis- (present on admission)   Assessment & Plan    GC, Chlamydia negative by PCR  Symptoms however are improved with Omnicef        Urinary retention- (present on admission)   Assessment & Plan    ? 2/2 urethritis- L spine OK  Symptoms are improving with urethritis  Will discontinue Estrada tomorrow and monitor urine output        Elevated alkaline phosphatase level- (present on admission)   Assessment & Plan    -Highly concerning for possible primary bone cancer, no hypercalcemia or kidney failure on labs          Quality-Core Measures   Reviewed items::  Labs reviewed and Medications reviewed  Estrada catheter::  Urinary Tract Retention or Urinary Tract Obstruction  DVT prophylaxis pharmacological::  Enoxaparin (Lovenox)  Ulcer Prophylaxis::  Not indicated  Antibiotics:  Treating active infection/contamination beyond 24 hours perioperative coverage  Assessed for rehabilitation services:  Patient unable to tolerate rehabilitation therapeutic regimen

## 2018-04-20 NOTE — PROGRESS NOTES
Assumed care at 1900  All VSS  No complaints of pain at this time  Call Light within reach .  Patient resting comfortably   Room Air AXO 4 / patient behavoirs    WDL

## 2018-04-20 NOTE — CARE PLAN
Problem: Bowel/Gastric:  Goal: Normal bowel function is maintained or improved    Intervention: Educate patient and significant other/support system about diet, fluid intake, medications and activity to promote bowel function  Educated patient and encouraged adequate water intake to assist in ensuring bowel movement.Patient declined bowel medicatons, as had several bowel movements yesterday , but agreed to increase fluid intake to assit in preventing constipation

## 2018-04-21 PROCEDURE — A9270 NON-COVERED ITEM OR SERVICE: HCPCS | Performed by: ORTHOPAEDIC SURGERY

## 2018-04-21 PROCEDURE — 700102 HCHG RX REV CODE 250 W/ 637 OVERRIDE(OP): Performed by: INTERNAL MEDICINE

## 2018-04-21 PROCEDURE — 700111 HCHG RX REV CODE 636 W/ 250 OVERRIDE (IP): Performed by: ORTHOPAEDIC SURGERY

## 2018-04-21 PROCEDURE — 770001 HCHG ROOM/CARE - MED/SURG/GYN PRIV*

## 2018-04-21 PROCEDURE — A9270 NON-COVERED ITEM OR SERVICE: HCPCS | Performed by: INTERNAL MEDICINE

## 2018-04-21 PROCEDURE — 700111 HCHG RX REV CODE 636 W/ 250 OVERRIDE (IP): Performed by: INTERNAL MEDICINE

## 2018-04-21 PROCEDURE — 99232 SBSQ HOSP IP/OBS MODERATE 35: CPT | Performed by: INTERNAL MEDICINE

## 2018-04-21 PROCEDURE — 51798 US URINE CAPACITY MEASURE: CPT

## 2018-04-21 PROCEDURE — 700102 HCHG RX REV CODE 250 W/ 637 OVERRIDE(OP): Performed by: ORTHOPAEDIC SURGERY

## 2018-04-21 RX ADMIN — ENOXAPARIN SODIUM 40 MG: 100 INJECTION SUBCUTANEOUS at 08:49

## 2018-04-21 RX ADMIN — KETOROLAC TROMETHAMINE 30 MG: 30 INJECTION, SOLUTION INTRAMUSCULAR at 13:51

## 2018-04-21 RX ADMIN — OXYCODONE HYDROCHLORIDE 10 MG: 10 TABLET ORAL at 23:03

## 2018-04-21 RX ADMIN — MORPHINE SULFATE 4 MG: 4 INJECTION INTRAVENOUS at 08:50

## 2018-04-21 RX ADMIN — OXYCODONE HYDROCHLORIDE 10 MG: 10 TABLET, FILM COATED, EXTENDED RELEASE ORAL at 08:50

## 2018-04-21 RX ADMIN — CEFDINIR 300 MG: 300 CAPSULE ORAL at 08:49

## 2018-04-21 RX ADMIN — MORPHINE SULFATE 4 MG: 4 INJECTION INTRAVENOUS at 16:04

## 2018-04-21 RX ADMIN — DIAZEPAM 2 MG: 2 TABLET ORAL at 08:49

## 2018-04-21 RX ADMIN — ACETAMINOPHEN 1000 MG: 500 TABLET ORAL at 11:30

## 2018-04-21 RX ADMIN — OXYCODONE HYDROCHLORIDE 10 MG: 10 TABLET ORAL at 13:51

## 2018-04-21 RX ADMIN — MORPHINE SULFATE 4 MG: 4 INJECTION INTRAVENOUS at 11:26

## 2018-04-21 RX ADMIN — OXYCODONE HYDROCHLORIDE 10 MG: 10 TABLET, FILM COATED, EXTENDED RELEASE ORAL at 21:18

## 2018-04-21 RX ADMIN — OXYCODONE HYDROCHLORIDE 10 MG: 10 TABLET ORAL at 18:44

## 2018-04-21 RX ADMIN — MORPHINE SULFATE 4 MG: 4 INJECTION INTRAVENOUS at 20:32

## 2018-04-21 RX ADMIN — DIAZEPAM 2 MG: 2 TABLET ORAL at 02:36

## 2018-04-21 RX ADMIN — OXYCODONE HYDROCHLORIDE 10 MG: 10 TABLET ORAL at 05:27

## 2018-04-21 RX ADMIN — DOXYCYCLINE 100 MG: 100 TABLET ORAL at 08:49

## 2018-04-21 RX ADMIN — OXYCODONE HYDROCHLORIDE 10 MG: 10 TABLET ORAL at 09:30

## 2018-04-21 RX ADMIN — ACETAMINOPHEN 1000 MG: 500 TABLET ORAL at 17:10

## 2018-04-21 RX ADMIN — CEFDINIR 300 MG: 300 CAPSULE ORAL at 20:23

## 2018-04-21 RX ADMIN — MORPHINE SULFATE 4 MG: 4 INJECTION INTRAVENOUS at 06:16

## 2018-04-21 RX ADMIN — ACETAMINOPHEN 1000 MG: 500 TABLET ORAL at 00:34

## 2018-04-21 RX ADMIN — DOXYCYCLINE 100 MG: 100 TABLET ORAL at 20:23

## 2018-04-21 RX ADMIN — DIAZEPAM 2 MG: 2 TABLET ORAL at 17:10

## 2018-04-21 RX ADMIN — KETOROLAC TROMETHAMINE 30 MG: 30 INJECTION, SOLUTION INTRAMUSCULAR at 07:21

## 2018-04-21 RX ADMIN — KETOROLAC TROMETHAMINE 30 MG: 30 INJECTION, SOLUTION INTRAMUSCULAR at 21:18

## 2018-04-21 RX ADMIN — ACETAMINOPHEN 1000 MG: 500 TABLET ORAL at 05:27

## 2018-04-21 RX ADMIN — OXYCODONE HYDROCHLORIDE 10 MG: 10 TABLET ORAL at 00:35

## 2018-04-21 ASSESSMENT — ENCOUNTER SYMPTOMS
VOMITING: 0
NAUSEA: 0
WEIGHT LOSS: 0
CHILLS: 0
NERVOUS/ANXIOUS: 0
FEVER: 0
SHORTNESS OF BREATH: 0
DEPRESSION: 0
ABDOMINAL PAIN: 0

## 2018-04-21 ASSESSMENT — PAIN SCALES - GENERAL
PAINLEVEL_OUTOF10: ASSUMED PAIN PRESENT
PAINLEVEL_OUTOF10: 8
PAINLEVEL_OUTOF10: 5
PAINLEVEL_OUTOF10: 7
PAINLEVEL_OUTOF10: 8
PAINLEVEL_OUTOF10: 6
PAINLEVEL_OUTOF10: 7
PAINLEVEL_OUTOF10: ASSUMED PAIN PRESENT
PAINLEVEL_OUTOF10: 7
PAINLEVEL_OUTOF10: 8
PAINLEVEL_OUTOF10: 7
PAINLEVEL_OUTOF10: 8

## 2018-04-21 NOTE — DISCHARGE PLANNING
Transfer Center:    Received call from Arcadio @ [facility].  Reported that since patient is an inmate [facility] is not license to hold prisoners in-house.  Request canceled at this time.    Call placed to WENDY Woody on unit to update on status.  She will inform medical team.

## 2018-04-21 NOTE — CARE PLAN
Problem: Venous Thromboembolism (VTW)/Deep Vein Thrombosis (DVT) Prevention:  Goal: Patient will participate in Venous Thrombosis (VTE)/Deep Vein Thrombosis (DVT)Prevention Measures  Outcome: PROGRESSING AS EXPECTED  Pt is on lovenox. He is refusing SCDs and education was provided     Problem: Pain Management  Goal: Pain level will decrease to patient's comfort goal  Outcome: PROGRESSING AS EXPECTED  Pt states current regime is effective if given on time or as close to time as possible

## 2018-04-21 NOTE — DISCHARGE PLANNING
Transfer Center:    Call placed to [facility] @ [phone].  Spoke with Arcadio who confirms that they do have Orthopedic Oncology services available.  Phone call was divert to the Referral Line to leave  regarding request for higher level of care.  Medical records (Facesheet, H&P, latest Progress notes, Ortho consult, recent labs, CTs, MRIs) faxed @ [phone].  Confirmation re

## 2018-04-21 NOTE — PROGRESS NOTES
[facility] Hospitalist Progress Note    Date of Service: 2018    Chief Complaint  37 y.o. Previously healthy male prisoner admitted 2018 with 2 month h/o r knee pain and sudden increased pain with pathologic fracture of distal R femur.    Interval Problem Update  Pride discontinued  Discussed bladder scan protocol  Discussed with RN  Discussed with case management, [place], [place] has also declined the patient  Asked  to send referral to [facility].    Consultants/Specialty  Orthopaedics     Disposition  Referral center        Review of Systems   Constitutional: Negative for chills, fever and weight loss.   Respiratory: Negative for shortness of breath.    Cardiovascular: Negative for chest pain.   Gastrointestinal: Negative for abdominal pain, nausea and vomiting.   Genitourinary: Negative for dysuria.        Pride was discontinued this morning   Musculoskeletal: Positive for joint pain (is reasonably comfortable without motion).   Psychiatric/Behavioral: Negative for depression and suicidal ideas. The patient is not nervous/anxious.       Physical Exam  Laboratory/Imaging   Hemodynamics  Temp (24hrs), Av.9 °C (98.4 °F), Min:36.6 °C (97.8 °F), Max:37.1 °C (98.7 °F)   Temperature: 36.6 °C (97.8 °F)  Pulse  Av.3  Min: 62  Max: 105    Blood Pressure: 126/86      Respiratory      Respiration: 16, Pulse Oximetry: 94 %             Fluids    Intake/Output Summary (Last 24 hours) at 18 1505  Last data filed at 18 0400   Gross per 24 hour   Intake              240 ml   Output              900 ml   Net             -660 ml       Nutrition  Orders Placed This Encounter   Procedures   • DIET ORDER     Standing Status:   Standing     Number of Occurrences:   1     Order Specific Question:   Diet:     Answer:   Regular [1]     Physical Exam   Constitutional: He is oriented to person, place, and time. He appears well-developed and well-nourished.   HENT:   Head: Normocephalic and atraumatic.   Eyes:  EOM are normal. Pupils are equal, round, and reactive to light. Right eye exhibits no discharge. Left eye exhibits no discharge.   Neck: Neck supple.   Cardiovascular: Normal rate and regular rhythm.    Pulmonary/Chest: Effort normal and breath sounds normal.   Abdominal: Soft. Bowel sounds are normal.   Musculoskeletal:   Knee in immobilizer   Neurological: He is alert and oriented to person, place, and time. No cranial nerve deficit.   Skin: Skin is warm and dry. He is not diaphoretic.   Numerous tattoos   Psychiatric: He has a normal mood and affect.   Nursing note and vitals reviewed.                               Assessment/Plan     * Pathologic fracture of femur (CMS-[facility])- (present on admission)   Assessment & Plan    Patient w/ progressive pain R knee x 2 months  Swelling x 3 days PTA  Acute pain, Fx  CT suspcious for path Fx, MRI same- ESR does not support Dx osteo  Plan Xfer [facility] when bed available (currently none)-looking into other facilities but none excepting, trying to [facility]        Urethritis- (present on admission)   Assessment & Plan    GC, Chlamydia negative by PCR  Symptoms however are improved with Omnicef        Urinary retention- (present on admission)   Assessment & Plan    ? 2/2 urethritis- L spine OK  Pride out today  Monitor voiding        Elevated alkaline phosphatase level- (present on admission)   Assessment & Plan    -Highly concerning for possible primary bone cancer, no hypercalcemia or kidney failure on labs          Quality-Core Measures   Reviewed items::  Medications reviewed  Pride catheter::  Urinary Tract Retention or Urinary Tract Obstruction  DVT prophylaxis pharmacological::  Enoxaparin (Lovenox)  Ulcer Prophylaxis::  Not indicated  Antibiotics:  Treating active infection/contamination beyond 24 hours perioperative coverage  Assessed for rehabilitation services:  Patient unable to tolerate rehabilitation therapeutic regimen (patient does not tolerate even rolling in bed  without significant pain-he did not tolerate traction and will not tolerate out of bed with unstable fracture)

## 2018-04-21 NOTE — DISCHARGE PLANNING
"Anticipated Discharge Disposition: Transfer to Cancer Center.     Action: LSW called the Transfer Center ([phone]) for an update and spoke w/ Shannon. Shnanon reports that [facility] has declined. [facility] has declined due to not contracted w/ insurance. [facility] declined due to \"no speciality\". [facility] declined due to no beds. [facility] declined as they do not have an Orthopedic Oncologist.      Barriers to Discharge: No accepting Cancer Centers at this time.      Plan: LSW to continue to follow and remain available for dc planning needs.  "

## 2018-04-21 NOTE — DISCHARGE PLANNING
Medical Social Work    Ongoing: Pt was discussed in IDT rounds and MD requested a referral be sent to [facility]. SW called the Transfer Center () and requested referral be sent to [facility]. Transfer Center stated they will make the call to [facility].

## 2018-04-21 NOTE — CARE PLAN
Problem: Safety  Goal: Will remain free from injury  Outcome: PROGRESSING AS EXPECTED  POC discussed, all questions answered at this time.     Problem: Bowel/Gastric:  Goal: Normal bowel function is maintained or improved  Outcome: PROGRESSING AS EXPECTED      Problem: Pain Management  Goal: Pain level will decrease to patient's comfort goal  Outcome: PROGRESSING AS EXPECTED  Pain managed with prn pain medication per MAR; warm pack applied to right knee.

## 2018-04-21 NOTE — PROGRESS NOTES
Received bedside shift report from night RN. Pt AOx4. Pt reports pain is 8/10 and comfort goal is 6/10 if possible. Does call appropriately. Bed alarm is off. Pt is in bed. He does move around in bed by himself. Pt has no with them in the room. 2 guards inside opt room. PIV assessed and is patent. Pt is on RA. POC discussed as well as unit routine, comfort, and safety. Dicussed DC planning to  [facility]. Discussed the goal for today: pain control, dc estrada and voiding on his own. Reviewed orders, notes, labs, and test results. Hourly rounding in place with RN rounding on odd hours and CNA on even hours. 12 hour chart check completed.

## 2018-04-22 PROCEDURE — 700111 HCHG RX REV CODE 636 W/ 250 OVERRIDE (IP): Performed by: ORTHOPAEDIC SURGERY

## 2018-04-22 PROCEDURE — 700102 HCHG RX REV CODE 250 W/ 637 OVERRIDE(OP): Performed by: ORTHOPAEDIC SURGERY

## 2018-04-22 PROCEDURE — A9270 NON-COVERED ITEM OR SERVICE: HCPCS | Performed by: ORTHOPAEDIC SURGERY

## 2018-04-22 PROCEDURE — A9270 NON-COVERED ITEM OR SERVICE: HCPCS | Performed by: INTERNAL MEDICINE

## 2018-04-22 PROCEDURE — 770001 HCHG ROOM/CARE - MED/SURG/GYN PRIV*

## 2018-04-22 PROCEDURE — 700102 HCHG RX REV CODE 250 W/ 637 OVERRIDE(OP): Performed by: INTERNAL MEDICINE

## 2018-04-22 PROCEDURE — 700111 HCHG RX REV CODE 636 W/ 250 OVERRIDE (IP): Performed by: INTERNAL MEDICINE

## 2018-04-22 PROCEDURE — 99232 SBSQ HOSP IP/OBS MODERATE 35: CPT | Performed by: INTERNAL MEDICINE

## 2018-04-22 RX ORDER — TAMSULOSIN HYDROCHLORIDE 0.4 MG/1
0.4 CAPSULE ORAL
Status: DISCONTINUED | OUTPATIENT
Start: 2018-04-22 | End: 2018-04-23

## 2018-04-22 RX ADMIN — CEFDINIR 300 MG: 300 CAPSULE ORAL at 08:22

## 2018-04-22 RX ADMIN — ENOXAPARIN SODIUM 40 MG: 100 INJECTION SUBCUTANEOUS at 08:22

## 2018-04-22 RX ADMIN — OXYCODONE HYDROCHLORIDE 10 MG: 10 TABLET, FILM COATED, EXTENDED RELEASE ORAL at 08:22

## 2018-04-22 RX ADMIN — OXYCODONE HYDROCHLORIDE 10 MG: 10 TABLET, FILM COATED, EXTENDED RELEASE ORAL at 21:57

## 2018-04-22 RX ADMIN — ACETAMINOPHEN 1000 MG: 500 TABLET ORAL at 05:30

## 2018-04-22 RX ADMIN — MORPHINE SULFATE 4 MG: 4 INJECTION INTRAVENOUS at 18:00

## 2018-04-22 RX ADMIN — OXYCODONE HYDROCHLORIDE 10 MG: 10 TABLET ORAL at 23:42

## 2018-04-22 RX ADMIN — OXYCODONE HYDROCHLORIDE 10 MG: 10 TABLET ORAL at 05:30

## 2018-04-22 RX ADMIN — ACETAMINOPHEN 1000 MG: 500 TABLET ORAL at 00:21

## 2018-04-22 RX ADMIN — MORPHINE SULFATE 4 MG: 4 INJECTION INTRAVENOUS at 20:50

## 2018-04-22 RX ADMIN — TAMSULOSIN HYDROCHLORIDE 0.4 MG: 0.4 CAPSULE ORAL at 16:26

## 2018-04-22 RX ADMIN — CEFDINIR 300 MG: 300 CAPSULE ORAL at 20:49

## 2018-04-22 RX ADMIN — DOXYCYCLINE 100 MG: 100 TABLET ORAL at 20:49

## 2018-04-22 RX ADMIN — MORPHINE SULFATE 4 MG: 4 INJECTION INTRAVENOUS at 14:27

## 2018-04-22 RX ADMIN — DIAZEPAM 2 MG: 2 TABLET ORAL at 14:27

## 2018-04-22 RX ADMIN — DIAZEPAM 2 MG: 2 TABLET ORAL at 23:45

## 2018-04-22 RX ADMIN — ACETAMINOPHEN 1000 MG: 500 TABLET ORAL at 12:48

## 2018-04-22 RX ADMIN — DOXYCYCLINE 100 MG: 100 TABLET ORAL at 08:22

## 2018-04-22 RX ADMIN — OXYCODONE HYDROCHLORIDE 10 MG: 10 TABLET ORAL at 16:26

## 2018-04-22 RX ADMIN — MORPHINE SULFATE 4 MG: 4 INJECTION INTRAVENOUS at 08:27

## 2018-04-22 RX ADMIN — KETOROLAC TROMETHAMINE 30 MG: 30 INJECTION, SOLUTION INTRAMUSCULAR at 05:31

## 2018-04-22 RX ADMIN — MORPHINE SULFATE 4 MG: 4 INJECTION INTRAVENOUS at 03:51

## 2018-04-22 RX ADMIN — DIAZEPAM 2 MG: 2 TABLET ORAL at 00:21

## 2018-04-22 RX ADMIN — OXYCODONE HYDROCHLORIDE 10 MG: 10 TABLET ORAL at 12:48

## 2018-04-22 RX ADMIN — OXYCODONE HYDROCHLORIDE 10 MG: 10 TABLET ORAL at 09:10

## 2018-04-22 RX ADMIN — ACETAMINOPHEN 1000 MG: 500 TABLET ORAL at 17:59

## 2018-04-22 RX ADMIN — DIAZEPAM 2 MG: 2 TABLET ORAL at 08:22

## 2018-04-22 ASSESSMENT — PAIN SCALES - GENERAL
PAINLEVEL_OUTOF10: 8
PAINLEVEL_OUTOF10: ASSUMED PAIN PRESENT
PAINLEVEL_OUTOF10: 8
PAINLEVEL_OUTOF10: 7
PAINLEVEL_OUTOF10: 8

## 2018-04-22 ASSESSMENT — ENCOUNTER SYMPTOMS
WEIGHT LOSS: 0
VOMITING: 0
NAUSEA: 0
DEPRESSION: 0
ABDOMINAL PAIN: 0
NERVOUS/ANXIOUS: 0
FEVER: 0
CHILLS: 0
SHORTNESS OF BREATH: 0

## 2018-04-22 NOTE — CARE PLAN
Problem: Venous Thromboembolism (VTW)/Deep Vein Thrombosis (DVT) Prevention:  Goal: Patient will participate in Venous Thrombosis (VTE)/Deep Vein Thrombosis (DVT)Prevention Measures  Outcome: PROGRESSING AS EXPECTED  SCDs to BLE on; pt receiving Lovenox per MAR.    Problem: Pain Management  Goal: Pain level will decrease to patient's comfort goal  Outcome: PROGRESSING AS EXPECTED  Pain being managed with scheduled and prn pain medication per MAR.

## 2018-04-22 NOTE — PROGRESS NOTES
[facility] Hospitalist Progress Note    Date of Service: 2018    Chief Complaint  37 y.o. Previously healthy male prisoner admitted 2018 with 2 month h/o r knee pain and sudden increased pain with pathologic fracture of distal R femur.    Interval Problem Update  Unable to void >900, > 700  Pride replaced. Discussed w/ Urology PA rec try flomax  Discussed w/ patient  He is becoming discouraged 2/2 lack of accepting facilities    Consultants/Specialty  Orthopaedics     Disposition  Referral center        Review of Systems   Constitutional: Negative for chills, fever and weight loss.   Respiratory: Negative for shortness of breath.    Cardiovascular: Negative for chest pain.   Gastrointestinal: Negative for abdominal pain, nausea and vomiting.   Genitourinary: Negative for dysuria.        Pride replaced   Musculoskeletal: Positive for joint pain (is reasonably comfortable without motion).   Psychiatric/Behavioral: Negative for depression and suicidal ideas. The patient is not nervous/anxious.       Physical Exam  Laboratory/Imaging   Hemodynamics  Temp (24hrs), Av.6 °C (97.9 °F), Min:36.3 °C (97.4 °F), Max:36.8 °C (98.2 °F)   Temperature: 36.7 °C (98 °F)  Pulse  Av.7  Min: 62  Max: 105    Blood Pressure: 149/80      Respiratory      Respiration: 18, Pulse Oximetry: 100 %             Fluids    Intake/Output Summary (Last 24 hours) at 18 1451  Last data filed at 18 0815   Gross per 24 hour   Intake              720 ml   Output             2600 ml   Net            -1880 ml       Nutrition  Orders Placed This Encounter   Procedures   • DIET ORDER     Standing Status:   Standing     Number of Occurrences:   1     Order Specific Question:   Diet:     Answer:   Regular [1]     Physical Exam   Constitutional: He is oriented to person, place, and time. He appears well-developed and well-nourished.   HENT:   Head: Normocephalic and atraumatic.   Eyes: EOM are normal. Pupils are equal, round, and  reactive to light. Right eye exhibits no discharge. Left eye exhibits no discharge.   Neck: Neck supple.   Cardiovascular: Normal rate and regular rhythm.    Pulmonary/Chest: Effort normal and breath sounds normal.   Abdominal: Soft. Bowel sounds are normal.   Genitourinary:   Genitourinary Comments: Pride clear out put   Musculoskeletal:   Knee in immobilizer   Neurological: He is alert and oriented to person, place, and time. No cranial nerve deficit.   Skin: Skin is warm and dry. He is not diaphoretic.   Numerous tattoos   Psychiatric: He has a normal mood and affect.   Nursing note and vitals reviewed.                               Assessment/Plan     * Pathologic fracture of femur (CMS-[facility])- (present on admission)   Assessment & Plan    Patient w/ progressive pain R knee x 2 months  Swelling x 3 days PTA  Acute pain, Fx  CT suspcious for path Fx, MRI same- ESR does not support Dx osteo  Plan Xfer [facility] when bed available (currently none)-looking into other facilities but none excepting, trying to [facility]        Urethritis- (present on admission)   Assessment & Plan    GC, Chlamydia negative by PCR  Symptoms however are improved with Omnicef        Urinary retention- (present on admission)   Assessment & Plan    ? 2/2 urethritis- L spine OK  Pride out - unable to void-replaced try flomax        Elevated alkaline phosphatase level- (present on admission)   Assessment & Plan    -Highly concerning for possible primary bone cancer, no hypercalcemia or kidney failure on labs          Quality-Core Measures   Reviewed items::  Medications reviewed  Pride catheter::  Urinary Tract Retention or Urinary Tract Obstruction  DVT prophylaxis pharmacological::  Enoxaparin (Lovenox)  Ulcer Prophylaxis::  Not indicated  Antibiotics:  Treating active infection/contamination beyond 24 hours perioperative coverage  Assessed for rehabilitation services:  Patient unable to tolerate rehabilitation therapeutic regimen (patient does not  tolerate even rolling in bed without significant pain-he did not tolerate traction and will not tolerate out of bed with unstable fracture)

## 2018-04-22 NOTE — CARE PLAN
Problem: Pain Management  Goal: Pain level will decrease to patient's comfort goal  Outcome: PROGRESSING SLOWER THAN EXPECTED  Pt w severe pain upon any movement of RLE; Multimodal pain management approaches in place; Pt consistantly rates pain at 7-10/10    Problem: Mobility  Goal: Risk for activity intolerance will decrease  Outcome: PROGRESSING AS EXPECTED  Pt preforming active ROM (expect w RLE) in bed

## 2018-04-22 NOTE — PROGRESS NOTES
Pt unable to void on own. Bladder scan showed 780 ml. Hospitalist paged. Order to insert a estrada catheter received from Dr. Mary Beth Mejia. Estrada inserted without any complications.

## 2018-04-23 PROBLEM — L27.0 ALLERGIC DRUG RASH: Status: ACTIVE | Noted: 2018-04-23

## 2018-04-23 PROCEDURE — 770001 HCHG ROOM/CARE - MED/SURG/GYN PRIV*

## 2018-04-23 PROCEDURE — A9270 NON-COVERED ITEM OR SERVICE: HCPCS | Performed by: INTERNAL MEDICINE

## 2018-04-23 PROCEDURE — 700102 HCHG RX REV CODE 250 W/ 637 OVERRIDE(OP): Performed by: INTERNAL MEDICINE

## 2018-04-23 PROCEDURE — 99232 SBSQ HOSP IP/OBS MODERATE 35: CPT | Performed by: INTERNAL MEDICINE

## 2018-04-23 PROCEDURE — A9270 NON-COVERED ITEM OR SERVICE: HCPCS | Performed by: ORTHOPAEDIC SURGERY

## 2018-04-23 PROCEDURE — 700102 HCHG RX REV CODE 250 W/ 637 OVERRIDE(OP): Performed by: ORTHOPAEDIC SURGERY

## 2018-04-23 PROCEDURE — 700111 HCHG RX REV CODE 636 W/ 250 OVERRIDE (IP): Performed by: INTERNAL MEDICINE

## 2018-04-23 RX ORDER — DIPHENHYDRAMINE HCL 25 MG
25 TABLET ORAL EVERY 6 HOURS PRN
Status: DISCONTINUED | OUTPATIENT
Start: 2018-04-23 | End: 2018-04-27 | Stop reason: HOSPADM

## 2018-04-23 RX ORDER — DOXAZOSIN 2 MG/1
4 TABLET ORAL
Status: DISCONTINUED | OUTPATIENT
Start: 2018-04-23 | End: 2018-04-27 | Stop reason: HOSPADM

## 2018-04-23 RX ADMIN — MORPHINE SULFATE 4 MG: 4 INJECTION INTRAVENOUS at 07:12

## 2018-04-23 RX ADMIN — OXYCODONE HYDROCHLORIDE 10 MG: 10 TABLET ORAL at 20:27

## 2018-04-23 RX ADMIN — ACETAMINOPHEN 1000 MG: 500 TABLET ORAL at 15:52

## 2018-04-23 RX ADMIN — MORPHINE SULFATE 4 MG: 4 INJECTION INTRAVENOUS at 00:56

## 2018-04-23 RX ADMIN — ACETAMINOPHEN 1000 MG: 500 TABLET ORAL at 06:16

## 2018-04-23 RX ADMIN — TAMSULOSIN HYDROCHLORIDE 0.4 MG: 0.4 CAPSULE ORAL at 07:29

## 2018-04-23 RX ADMIN — ENOXAPARIN SODIUM 40 MG: 100 INJECTION SUBCUTANEOUS at 07:29

## 2018-04-23 RX ADMIN — OXYCODONE HYDROCHLORIDE 10 MG: 10 TABLET ORAL at 15:50

## 2018-04-23 RX ADMIN — OXYCODONE HYDROCHLORIDE 10 MG: 10 TABLET ORAL at 07:29

## 2018-04-23 RX ADMIN — MORPHINE SULFATE 4 MG: 4 INJECTION INTRAVENOUS at 21:11

## 2018-04-23 RX ADMIN — CEFDINIR 300 MG: 300 CAPSULE ORAL at 07:29

## 2018-04-23 RX ADMIN — DIAZEPAM 2 MG: 2 TABLET ORAL at 13:14

## 2018-04-23 RX ADMIN — MORPHINE SULFATE 4 MG: 4 INJECTION INTRAVENOUS at 23:13

## 2018-04-23 RX ADMIN — ACETAMINOPHEN 1000 MG: 500 TABLET ORAL at 23:13

## 2018-04-23 RX ADMIN — OXYCODONE HYDROCHLORIDE 10 MG: 10 TABLET ORAL at 03:30

## 2018-04-23 RX ADMIN — ACETAMINOPHEN 1000 MG: 500 TABLET ORAL at 00:55

## 2018-04-23 RX ADMIN — DIAZEPAM 2 MG: 2 TABLET ORAL at 06:16

## 2018-04-23 RX ADMIN — OXYCODONE HYDROCHLORIDE 10 MG: 10 TABLET, FILM COATED, EXTENDED RELEASE ORAL at 07:29

## 2018-04-23 RX ADMIN — OXYCODONE HYDROCHLORIDE 10 MG: 10 TABLET, FILM COATED, EXTENDED RELEASE ORAL at 20:27

## 2018-04-23 RX ADMIN — DOXAZOSIN 4 MG: 2 TABLET ORAL at 20:27

## 2018-04-23 RX ADMIN — OXYCODONE HYDROCHLORIDE 10 MG: 10 TABLET ORAL at 11:07

## 2018-04-23 RX ADMIN — MORPHINE SULFATE 4 MG: 4 INJECTION INTRAVENOUS at 09:52

## 2018-04-23 RX ADMIN — DOXYCYCLINE 100 MG: 100 TABLET ORAL at 07:29

## 2018-04-23 RX ADMIN — MORPHINE SULFATE 4 MG: 4 INJECTION INTRAVENOUS at 15:53

## 2018-04-23 RX ADMIN — DIAZEPAM 2 MG: 2 TABLET ORAL at 17:55

## 2018-04-23 RX ADMIN — DIPHENHYDRAMINE HCL 25 MG: 25 TABLET ORAL at 17:55

## 2018-04-23 RX ADMIN — MORPHINE SULFATE 4 MG: 4 INJECTION INTRAVENOUS at 18:57

## 2018-04-23 ASSESSMENT — PAIN SCALES - GENERAL
PAINLEVEL_OUTOF10: 9
PAINLEVEL_OUTOF10: 8
PAINLEVEL_OUTOF10: 9
PAINLEVEL_OUTOF10: 7
PAINLEVEL_OUTOF10: 8
PAINLEVEL_OUTOF10: 7
PAINLEVEL_OUTOF10: 7
PAINLEVEL_OUTOF10: 8
PAINLEVEL_OUTOF10: 9
PAINLEVEL_OUTOF10: 8
PAINLEVEL_OUTOF10: 7
PAINLEVEL_OUTOF10: ASSUMED PAIN PRESENT

## 2018-04-23 ASSESSMENT — ENCOUNTER SYMPTOMS
NERVOUS/ANXIOUS: 0
SHORTNESS OF BREATH: 0
DEPRESSION: 0
FEVER: 0
CHILLS: 0
NAUSEA: 0
ABDOMINAL PAIN: 0
WEIGHT LOSS: 0
VOMITING: 0

## 2018-04-23 NOTE — PROGRESS NOTES
[facility] Hospitalist Progress Note    Date of Service: 2018    Chief Complaint  37 y.o. Previously healthy male prisoner admitted 2018 with 2 month h/o r knee pain and sudden increased pain with pathologic fracture of distal R femur.    Interval Problem Update  Flomax started yesterday  Patient has hives around his wrist cuffs, right thigh, abdomen.  When tried on Rocephin several days ago he did have some red streaking in his arm-but tolerated Omnicef for the last several days- remains a possible cause of his reaction  Omnicef, Flomax, doxycycline discontinued  Case management will call [facility] daily to see when bed becomes available.  Looking into other centers as all [place] referral facilities have declined otherwise.    Consultants/Specialty  Orthopaedics     Disposition  Referral center        Review of Systems   Constitutional: Negative for chills, fever and weight loss.   Respiratory: Negative for shortness of breath.    Cardiovascular: Negative for chest pain.   Gastrointestinal: Negative for abdominal pain, nausea and vomiting.   Genitourinary: Negative for dysuria.        Pride replaced   Musculoskeletal: Positive for joint pain (is reasonably comfortable without motion).   Skin: Positive for itching and rash.   Psychiatric/Behavioral: Negative for depression and suicidal ideas. The patient is not nervous/anxious.       Physical Exam  Laboratory/Imaging   Hemodynamics  Temp (24hrs), Av.5 °C (97.7 °F), Min:36.1 °C (97 °F), Max:36.8 °C (98.2 °F)   Temperature: 36.6 °C (97.9 °F)  Pulse  Av.6  Min: 62  Max: 105    Blood Pressure: 147/66      Respiratory      Respiration: 16, Pulse Oximetry: 92 %             Fluids    Intake/Output Summary (Last 24 hours) at 18 1433  Last data filed at 18 1200   Gross per 24 hour   Intake             1920 ml   Output             4500 ml   Net            -2580 ml       Nutrition  Orders Placed This Encounter   Procedures   • DIET ORDER      Standing Status:   Standing     Number of Occurrences:   1     Order Specific Question:   Diet:     Answer:   Regular [1]     Physical Exam   Constitutional: He is oriented to person, place, and time. He appears well-developed and well-nourished.   HENT:   Head: Normocephalic and atraumatic.   Eyes: EOM are normal. Pupils are equal, round, and reactive to light. Right eye exhibits no discharge. Left eye exhibits no discharge.   Neck: Neck supple.   Cardiovascular: Normal rate and regular rhythm.    Pulmonary/Chest: Effort normal and breath sounds normal.   Abdominal: Soft. Bowel sounds are normal.   Genitourinary:   Genitourinary Comments: Pride clear out put   Musculoskeletal:   Knee in immobilizer   Neurological: He is alert and oriented to person, place, and time. No cranial nerve deficit.   Skin: Skin is warm and dry. Rash (urticaria under both wrist handcuffs, abdomen, right lateral thigh) noted. He is not diaphoretic.   Numerous tattoos   Psychiatric: He has a normal mood and affect.   Nursing note and vitals reviewed.                               Assessment/Plan     * Pathologic fracture of femur (CMS-HCC)- (present on admission)   Assessment & Plan    Patient w/ progressive pain R knee x 2 months  Swelling x 3 days PTA  Acute pain, Fx  CT suspcious for path Fx, MRI same- ESR does not support Dx osteo  Plan Xfer [facility] when bed available (currently none)-[place], [facility], [facility], [place] have all declined the patient        Allergic drug rash   Assessment & Plan    Patient had red streak up his arm with IV Rocephin  But tolerated Omnicef for several days  Today has hives- but Flomax started yesterday  Has also been on doxycycline recently  All the above medications have been discontinued  Benadryl added  Less likely this is secondary to his pain medication-this will need to be changed if this worsens or fails to improve        Urethritis- (present on admission)   Assessment & Plan    GC, Chlamydia negative by  PCR  Symptoms however are improved with Omnicef        Urinary retention- (present on admission)   Assessment & Plan    ? 2/2 urethritis- L spine OK  Pride removed-still unable to void  Urology recommended starting Flomax-now has drug rash  Trying Cardura  If tolerates remove Pride in 1-2 days-         Elevated alkaline phosphatase level- (present on admission)   Assessment & Plan    -Highly concerning for possible primary bone cancer, no hypercalcemia or kidney failure on labs          Quality-Core Measures   Reviewed items::  Medications reviewed  Pride catheter::  Urinary Tract Retention or Urinary Tract Obstruction  DVT prophylaxis pharmacological::  Enoxaparin (Lovenox)  Ulcer Prophylaxis::  Not indicated  Antibiotics:  Treating active infection/contamination beyond 24 hours perioperative coverage  Assessed for rehabilitation services:  Patient unable to tolerate rehabilitation therapeutic regimen (patient does not tolerate even rolling in bed without significant pain-he did not tolerate traction and will not tolerate out of bed with unstable fracture)

## 2018-04-23 NOTE — DISCHARGE PLANNING
Transfer Center:    Call placed to [facility] @ [phone].  Spoke with Gianna.  She reported pt is being denied due to no beds availability at this time.    Call placed to [facility] @ [phone].  Spoke with Tamika.  Requested information for transfer request.  She stated that patient's provider needs to call in to the Transfer Center @ [phone] to initiate request.    Call placed to unit,  [phone].  Spoke with Brandi.  Information provided to inform LATONYA Troy regarding process to initiate request.

## 2018-04-23 NOTE — DISCHARGE PLANNING
Anticipated Discharge Disposition:Cancer specialty unit    Action: Refer to [facility] and remain on [facility] waiting list for bed. Spoke to Aracelis at transfer center this am concerning both of these referrals.    Barriers to Discharge: bed availability and ability to accomadate inmmate    Plan: Cancer specialty facility

## 2018-04-23 NOTE — ASSESSMENT & PLAN NOTE
Patient had red streak up his arm with IV Rocephin  But tolerated Omnicef for several days  Today has hives- but Flomax started yesterday  Has also been on doxycycline recently  All the above medications have been discontinued  Benadryl added  Less likely this is secondary to his pain medication-this will need to be changed if this worsens or fails to improve

## 2018-04-23 NOTE — CARE PLAN
Problem: Safety  Goal: Will remain free from falls  Outcome: PROGRESSING AS EXPECTED  Call light within reach; hourly rounding in place.     Problem: Bowel/Gastric:  Goal: Will not experience complications related to bowel motility  Outcome: PROGRESSING AS EXPECTED

## 2018-04-24 LAB
BACTERIA BLD CULT: NORMAL
BACTERIA BLD CULT: NORMAL
SIGNIFICANT IND 70042: NORMAL
SIGNIFICANT IND 70042: NORMAL
SITE SITE: NORMAL
SITE SITE: NORMAL
SOURCE SOURCE: NORMAL
SOURCE SOURCE: NORMAL

## 2018-04-24 PROCEDURE — 700111 HCHG RX REV CODE 636 W/ 250 OVERRIDE (IP): Performed by: INTERNAL MEDICINE

## 2018-04-24 PROCEDURE — 700102 HCHG RX REV CODE 250 W/ 637 OVERRIDE(OP): Performed by: ORTHOPAEDIC SURGERY

## 2018-04-24 PROCEDURE — A9270 NON-COVERED ITEM OR SERVICE: HCPCS | Performed by: ORTHOPAEDIC SURGERY

## 2018-04-24 PROCEDURE — 99232 SBSQ HOSP IP/OBS MODERATE 35: CPT | Performed by: INTERNAL MEDICINE

## 2018-04-24 PROCEDURE — 770001 HCHG ROOM/CARE - MED/SURG/GYN PRIV*

## 2018-04-24 PROCEDURE — 700102 HCHG RX REV CODE 250 W/ 637 OVERRIDE(OP): Performed by: INTERNAL MEDICINE

## 2018-04-24 PROCEDURE — A9270 NON-COVERED ITEM OR SERVICE: HCPCS | Performed by: INTERNAL MEDICINE

## 2018-04-24 RX ADMIN — DIAZEPAM 2 MG: 2 TABLET ORAL at 20:34

## 2018-04-24 RX ADMIN — MORPHINE SULFATE 4 MG: 4 INJECTION INTRAVENOUS at 13:06

## 2018-04-24 RX ADMIN — OXYCODONE HYDROCHLORIDE 10 MG: 10 TABLET ORAL at 13:06

## 2018-04-24 RX ADMIN — ACETAMINOPHEN 1000 MG: 500 TABLET ORAL at 11:06

## 2018-04-24 RX ADMIN — ACETAMINOPHEN 650 MG: 325 TABLET, FILM COATED ORAL at 22:15

## 2018-04-24 RX ADMIN — MORPHINE SULFATE 4 MG: 4 INJECTION INTRAVENOUS at 16:08

## 2018-04-24 RX ADMIN — OXYCODONE HYDROCHLORIDE 10 MG: 10 TABLET, FILM COATED, EXTENDED RELEASE ORAL at 09:11

## 2018-04-24 RX ADMIN — MORPHINE SULFATE 4 MG: 4 INJECTION INTRAVENOUS at 23:07

## 2018-04-24 RX ADMIN — SENNOSIDES AND DOCUSATE SODIUM 2 TABLET: 8.6; 5 TABLET ORAL at 09:10

## 2018-04-24 RX ADMIN — DIAZEPAM 2 MG: 2 TABLET ORAL at 13:06

## 2018-04-24 RX ADMIN — MORPHINE SULFATE 4 MG: 4 INJECTION INTRAVENOUS at 20:38

## 2018-04-24 RX ADMIN — ACETAMINOPHEN 1000 MG: 500 TABLET ORAL at 04:56

## 2018-04-24 RX ADMIN — MORPHINE SULFATE 4 MG: 4 INJECTION INTRAVENOUS at 04:56

## 2018-04-24 RX ADMIN — ENOXAPARIN SODIUM 40 MG: 100 INJECTION SUBCUTANEOUS at 09:10

## 2018-04-24 RX ADMIN — MORPHINE SULFATE 4 MG: 4 INJECTION INTRAVENOUS at 18:36

## 2018-04-24 RX ADMIN — DIAZEPAM 2 MG: 2 TABLET ORAL at 05:00

## 2018-04-24 RX ADMIN — DOXAZOSIN 4 MG: 2 TABLET ORAL at 20:34

## 2018-04-24 RX ADMIN — ACETAMINOPHEN 1000 MG: 500 TABLET ORAL at 17:20

## 2018-04-24 RX ADMIN — MORPHINE SULFATE 4 MG: 4 INJECTION INTRAVENOUS at 10:56

## 2018-04-24 RX ADMIN — MORPHINE SULFATE 4 MG: 4 INJECTION INTRAVENOUS at 01:19

## 2018-04-24 RX ADMIN — OXYCODONE HYDROCHLORIDE 10 MG: 10 TABLET ORAL at 17:20

## 2018-04-24 RX ADMIN — MORPHINE SULFATE 4 MG: 4 INJECTION INTRAVENOUS at 07:28

## 2018-04-24 RX ADMIN — OXYCODONE HYDROCHLORIDE 10 MG: 10 TABLET, FILM COATED, EXTENDED RELEASE ORAL at 22:15

## 2018-04-24 RX ADMIN — OXYCODONE HYDROCHLORIDE 10 MG: 10 TABLET ORAL at 09:11

## 2018-04-24 RX ADMIN — OXYCODONE HYDROCHLORIDE 10 MG: 10 TABLET ORAL at 23:07

## 2018-04-24 ASSESSMENT — ENCOUNTER SYMPTOMS
DIZZINESS: 0
HEARTBURN: 0
COUGH: 0
ORTHOPNEA: 0
DEPRESSION: 0
NAUSEA: 0
CHILLS: 0
HEADACHES: 0
MYALGIAS: 1
FEVER: 0

## 2018-04-24 ASSESSMENT — PAIN SCALES - GENERAL
PAINLEVEL_OUTOF10: 8
PAINLEVEL_OUTOF10: 7
PAINLEVEL_OUTOF10: 8

## 2018-04-24 ASSESSMENT — LIFESTYLE VARIABLES: SUBSTANCE_ABUSE: 1

## 2018-04-24 NOTE — DISCHARGE PLANNING
One Orthooncologist located in [place]: Dr. Sly Navas. Office # [phone]. Attempted to contact but was unable to do so today. MD will be in office on Thrus 4/26 8-4. Left message for Eloisa PAULINO RN CM on ortho re same. Hope for peer-2-peer on Thursday re poss transfer. If Dr. Navas accepts, will need to know what hospital he wishes patient transferred to. All info will need to go to Maximo WILEY at [facility] ([phone]). He will then contact [facility] officials that can make decisions and arrangements. See other [place] planning notes for other /numbers.

## 2018-04-25 ENCOUNTER — APPOINTMENT (OUTPATIENT)
Dept: RADIOLOGY | Facility: MEDICAL CENTER | Age: 38
DRG: 544 | End: 2018-04-25
Attending: ORTHOPAEDIC SURGERY
Payer: MEDICAID

## 2018-04-25 PROCEDURE — 700102 HCHG RX REV CODE 250 W/ 637 OVERRIDE(OP): Performed by: ORTHOPAEDIC SURGERY

## 2018-04-25 PROCEDURE — 700111 HCHG RX REV CODE 636 W/ 250 OVERRIDE (IP): Performed by: INTERNAL MEDICINE

## 2018-04-25 PROCEDURE — 71260 CT THORAX DX C+: CPT

## 2018-04-25 PROCEDURE — A9270 NON-COVERED ITEM OR SERVICE: HCPCS | Performed by: INTERNAL MEDICINE

## 2018-04-25 PROCEDURE — 700102 HCHG RX REV CODE 250 W/ 637 OVERRIDE(OP): Performed by: INTERNAL MEDICINE

## 2018-04-25 PROCEDURE — A9270 NON-COVERED ITEM OR SERVICE: HCPCS | Performed by: ORTHOPAEDIC SURGERY

## 2018-04-25 PROCEDURE — 99233 SBSQ HOSP IP/OBS HIGH 50: CPT | Performed by: INTERNAL MEDICINE

## 2018-04-25 PROCEDURE — 770001 HCHG ROOM/CARE - MED/SURG/GYN PRIV*

## 2018-04-25 PROCEDURE — 700117 HCHG RX CONTRAST REV CODE 255: Performed by: ORTHOPAEDIC SURGERY

## 2018-04-25 PROCEDURE — A9503 TC99M MEDRONATE: HCPCS

## 2018-04-25 RX ORDER — MORPHINE SULFATE 4 MG/ML
4 INJECTION, SOLUTION INTRAMUSCULAR; INTRAVENOUS
Status: DISCONTINUED | OUTPATIENT
Start: 2018-04-25 | End: 2018-04-27 | Stop reason: HOSPADM

## 2018-04-25 RX ORDER — GABAPENTIN 100 MG/1
100 CAPSULE ORAL 3 TIMES DAILY
Status: DISCONTINUED | OUTPATIENT
Start: 2018-04-25 | End: 2018-04-27 | Stop reason: HOSPADM

## 2018-04-25 RX ORDER — OXYCODONE HYDROCHLORIDE 10 MG/1
10 TABLET ORAL EVERY 6 HOURS
Status: DISCONTINUED | OUTPATIENT
Start: 2018-04-25 | End: 2018-04-27 | Stop reason: HOSPADM

## 2018-04-25 RX ADMIN — MORPHINE SULFATE 4 MG: 4 INJECTION INTRAVENOUS at 04:53

## 2018-04-25 RX ADMIN — MORPHINE SULFATE 4 MG: 4 INJECTION INTRAVENOUS at 11:21

## 2018-04-25 RX ADMIN — ENOXAPARIN SODIUM 40 MG: 100 INJECTION SUBCUTANEOUS at 07:27

## 2018-04-25 RX ADMIN — MORPHINE SULFATE 4 MG: 4 INJECTION INTRAVENOUS at 13:36

## 2018-04-25 RX ADMIN — OXYCODONE HYDROCHLORIDE 10 MG: 10 TABLET, FILM COATED, EXTENDED RELEASE ORAL at 07:27

## 2018-04-25 RX ADMIN — MORPHINE SULFATE 4 MG: 4 INJECTION INTRAVENOUS at 19:10

## 2018-04-25 RX ADMIN — OXYCODONE HYDROCHLORIDE 10 MG: 10 TABLET, FILM COATED, EXTENDED RELEASE ORAL at 20:53

## 2018-04-25 RX ADMIN — ACETAMINOPHEN 1000 MG: 500 TABLET ORAL at 22:40

## 2018-04-25 RX ADMIN — ACETAMINOPHEN 1000 MG: 500 TABLET ORAL at 11:27

## 2018-04-25 RX ADMIN — SENNOSIDES AND DOCUSATE SODIUM 2 TABLET: 8.6; 5 TABLET ORAL at 20:53

## 2018-04-25 RX ADMIN — OXYCODONE HYDROCHLORIDE 10 MG: 10 TABLET ORAL at 10:48

## 2018-04-25 RX ADMIN — GABAPENTIN 100 MG: 100 CAPSULE ORAL at 20:53

## 2018-04-25 RX ADMIN — DOXAZOSIN 4 MG: 2 TABLET ORAL at 20:53

## 2018-04-25 RX ADMIN — MORPHINE SULFATE 4 MG: 4 INJECTION INTRAVENOUS at 16:07

## 2018-04-25 RX ADMIN — OXYCODONE HYDROCHLORIDE 10 MG: 10 TABLET ORAL at 14:46

## 2018-04-25 RX ADMIN — GABAPENTIN 100 MG: 100 CAPSULE ORAL at 16:07

## 2018-04-25 RX ADMIN — DIAZEPAM 2 MG: 2 TABLET ORAL at 17:48

## 2018-04-25 RX ADMIN — IOHEXOL 100 ML: 350 INJECTION, SOLUTION INTRAVENOUS at 10:40

## 2018-04-25 RX ADMIN — ACETAMINOPHEN 1000 MG: 500 TABLET ORAL at 00:30

## 2018-04-25 RX ADMIN — DIAZEPAM 2 MG: 2 TABLET ORAL at 04:52

## 2018-04-25 RX ADMIN — ACETAMINOPHEN 1000 MG: 500 TABLET ORAL at 17:48

## 2018-04-25 RX ADMIN — DIAZEPAM 2 MG: 2 TABLET ORAL at 10:50

## 2018-04-25 RX ADMIN — OXYCODONE HYDROCHLORIDE 10 MG: 10 TABLET ORAL at 03:04

## 2018-04-25 RX ADMIN — OXYCODONE HYDROCHLORIDE 10 MG: 10 TABLET ORAL at 20:53

## 2018-04-25 RX ADMIN — OXYCODONE HYDROCHLORIDE 10 MG: 10 TABLET ORAL at 06:49

## 2018-04-25 RX ADMIN — MORPHINE SULFATE 4 MG: 4 INJECTION INTRAVENOUS at 07:27

## 2018-04-25 RX ADMIN — MORPHINE SULFATE 4 MG: 4 INJECTION INTRAVENOUS at 22:41

## 2018-04-25 RX ADMIN — MORPHINE SULFATE 4 MG: 4 INJECTION INTRAVENOUS at 01:45

## 2018-04-25 ASSESSMENT — PAIN SCALES - GENERAL
PAINLEVEL_OUTOF10: 9
PAINLEVEL_OUTOF10: 9
PAINLEVEL_OUTOF10: 6
PAINLEVEL_OUTOF10: 7
PAINLEVEL_OUTOF10: 9
PAINLEVEL_OUTOF10: 9
PAINLEVEL_OUTOF10: 8
PAINLEVEL_OUTOF10: 8
PAINLEVEL_OUTOF10: 7
PAINLEVEL_OUTOF10: 8
PAINLEVEL_OUTOF10: 10
PAINLEVEL_OUTOF10: 8

## 2018-04-25 ASSESSMENT — ENCOUNTER SYMPTOMS
DEPRESSION: 0
BRUISES/BLEEDS EASILY: 0
MYALGIAS: 0
CHILLS: 0
NAUSEA: 0
FEVER: 0
COUGH: 0
HEARTBURN: 0
PALPITATIONS: 0
DIZZINESS: 0
HEADACHES: 0
BLURRED VISION: 0

## 2018-04-25 ASSESSMENT — LIFESTYLE VARIABLES: SUBSTANCE_ABUSE: 1

## 2018-04-25 NOTE — PROGRESS NOTES
Patient refused senna despite last BM 3 days ago. Educated patient on importance of regular bowel movements and risk of constipation with narcotics. Patient states if he doesn't have BM today he will take the senna later.

## 2018-04-25 NOTE — PROGRESS NOTES
[facility] Hospitalist Progress Note    Date of Service: 2018    Chief Complaint  37 y.o. male admitted 2018 with 2 month h/o r knee pain and sudden increased pain with pathologic fracture of distal R femur.       Interval Problem Update  : Comminuted angulated displaced distal right femoral fracture.  Suspect underlying malignant bone lesion.  : Patient has pain from rle pathological fx site. Recommendation is to f/u with ortho oncology and he is pending transfer.   : ortho here spoke to  ([facility]) who has accepted to evaluate patient. Per cm, they informed [facility] about the pending transfer and a decision will be made by tomorrow. Also discussed current pain regimen and the need to modify it due to risk of overmedicating. His pain is relatively well controlled. No fevers, or other issues. Patient's last bm 3 days ago. [place] estrada    Consultants/Specialty  Orthopedic surgery (pepe), ID ([facility])    Disposition  Continue RLE nwb, anticipating transfer to [facility].         Review of Systems   Constitutional: Negative for chills and fever.   HENT: Negative for hearing loss and tinnitus.    Eyes: Negative for blurred vision.   Respiratory: Negative for cough.    Cardiovascular: Negative for chest pain and palpitations.   Gastrointestinal: Negative for heartburn and nausea.   Genitourinary: Negative for dysuria.   Musculoskeletal: Positive for joint pain. Negative for myalgias.   Neurological: Negative for dizziness and headaches.   Endo/Heme/Allergies: Does not bruise/bleed easily.   Psychiatric/Behavioral: Positive for substance abuse. Negative for depression.      Physical Exam  Laboratory/Imaging   Hemodynamics  Temp (24hrs), Av.6 °C (97.8 °F), Min:36.3 °C (97.3 °F), Max:36.8 °C (98.3 °F)   Temperature: 36.8 °C (98.3 °F)  Pulse  Av.8  Min: 62  Max: 105    Blood Pressure: 131/86      Respiratory      Respiration: 17, Pulse Oximetry: 95 %             Fluids    Intake/Output  Summary (Last 24 hours) at 04/25/18 1736  Last data filed at 04/25/18 1230   Gross per 24 hour   Intake              600 ml   Output                0 ml   Net              600 ml       Nutrition  Orders Placed This Encounter   Procedures   • DIET ORDER     Standing Status:   Standing     Number of Occurrences:   1     Order Specific Question:   Diet:     Answer:   Regular [1]     Physical Exam   Constitutional: He is oriented to person, place, and time.   Remains bedridden due to rle fx/pain   HENT:   Head: Atraumatic.   Eyes: Pupils are equal, round, and reactive to light.   Neck: Neck supple.   Cardiovascular: Normal rate, regular rhythm, normal heart sounds and intact distal pulses.    Pulmonary/Chest: Effort normal and breath sounds normal. No respiratory distress.   Abdominal: Soft. Bowel sounds are normal. He exhibits no distension.   Musculoskeletal: He exhibits tenderness.   rle in an immobilizer, has pain with manipulation of limb, nwb per ortho   Neurological: He is alert and oriented to person, place, and time.   Skin: Skin is warm and dry. No rash noted.   Psychiatric: He has a normal mood and affect.                                Assessment/Plan     * Pathologic fracture of femur (CMS-HCC)- (present on admission)   Assessment & Plan    Patient w/ progressive pain R knee x 2 months  Swelling x 3 days PTA  Acute pain, Fx  CT suspcious for path Fx, MRI same- ESR does not support Dx osteo  Plan Xfer [facility] when bed available (currently none)-[facility], [facility], [facility], [place] have all declined the patient    Continue rle nwb, immobilization  Change pain regimen to morphine 4mg q3hrs prn for breakthrough, do oxycodone 10mg every 6hrs, add gabapentin 100 tid          Allergic drug rash   Assessment & Plan    Patient had red streak up his arm with IV Rocephin  But tolerated Omnicef for several days  Today has hives- but Flomax started yesterday  Has also been on doxycycline recently  All the above medications have  been discontinued  Benadryl added  Less likely this is secondary to his pain medication-this will need to be changed if this worsens or fails to improve        Urethritis   Assessment & Plan    GC, Chlamydia negative by PCR  Symptoms however are improved with Omnicef        Urinary retention- (present on admission)   Assessment & Plan    ? 2/2 urethritis- L spine OK  Estrada removed-still unable to void  Urology recommended starting Flomax-now has drug rash  Trying Cardura  Asked to dc estrada today, initiate voiding trials        Elevated alkaline phosphatase level- (present on admission)   Assessment & Plan    -Highly concerning for possible primary bone cancer, no hypercalcemia or kidney failure on labs          Quality-Core Measures   Reviewed items::  Labs reviewed, Radiology images reviewed and Medications reviewed  Estrada catheter::  No Estrada  DVT prophylaxis pharmacological::  Enoxaparin (Lovenox)

## 2018-04-25 NOTE — DISCHARGE PLANNING
Call placed to Maximo WILEY at [facility] at [phone] to update him that Dr. Gudino from [facility] is willing to accept the patient.  Maximo aware and will let us know a plan as early as tomorrow. Will await decision from [place].

## 2018-04-25 NOTE — PROGRESS NOTES
Discussed with patient possible transfer to [facility]  No new c/o  Some pain  No fevers  Right leg - skin intact  AVSS  Awaiting tx to ortho oncologist, appropriate specialist not available in [place]  Dr. 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

## 2018-04-25 NOTE — PROGRESS NOTES
Was just made aware that patient is still inpatient and awaiting transfer to orthopaedic oncologist. I spoke to Dr. Gudino (at [facility]) who has agreed to take care of the patient and will speak to the transfer center tomorrow to try to expedite.    Will waiting, will order CT chest/abdomen/pelvis and bone scan. These tests should not delay transfer however.

## 2018-04-25 NOTE — PROGRESS NOTES
[facility] Hospitalist Progress Note    Date of Service: 2018    Chief Complaint  37 y.o. male admitted 2018 with 2 month h/o r knee pain and sudden increased pain with pathologic fracture of distal R femur.    Interval Problem Update  : Comminuted angulated displaced distal right femoral fracture.  Suspect underlying malignant bone lesion.  : Patient has pain from rle pathological fx site. Recommendation is to f/u with ortho oncology and he is pending transfer.     Consultants/Specialty  Orthopedic surgery (pepe), ID ([facility])    Disposition  Continue NWB RLE, knee immobilizer. Pending transfer for orthooncology evaluation.         Review of Systems   Constitutional: Negative for chills and fever.   HENT: Negative for hearing loss and tinnitus.    Respiratory: Negative for cough.    Cardiovascular: Negative for chest pain and orthopnea.   Gastrointestinal: Negative for heartburn and nausea.   Musculoskeletal: Positive for joint pain and myalgias.        Pain from rle   Skin: Negative for itching and rash.   Neurological: Negative for dizziness and headaches.   Psychiatric/Behavioral: Positive for substance abuse. Negative for depression and suicidal ideas.      Physical Exam  Laboratory/Imaging   Hemodynamics  Temp (24hrs), Av.4 °C (97.6 °F), Min:36.2 °C (97.1 °F), Max:36.9 °C (98.5 °F)   Temperature: 36.9 °C (98.5 °F)  Pulse  Av.2  Min: 62  Max: 105    Blood Pressure: 113/77      Respiratory      Respiration: 16, Pulse Oximetry: 95 %             Fluids    Intake/Output Summary (Last 24 hours) at 18 1837  Last data filed at 18 1600   Gross per 24 hour   Intake              960 ml   Output             2250 ml   Net            -1290 ml       Nutrition  Orders Placed This Encounter   Procedures   • DIET ORDER     Standing Status:   Standing     Number of Occurrences:   1     Order Specific Question:   Diet:     Answer:   Regular [1]     Physical Exam   Constitutional: He is  oriented to person, place, and time. No distress.   Young male, laying in bed   Eyes: Left eye exhibits no discharge.   Neck: Neck supple.   Cardiovascular: Normal rate, regular rhythm, normal heart sounds and intact distal pulses.    Pulmonary/Chest: Effort normal and breath sounds normal. No respiratory distress.   Abdominal: Soft. Bowel sounds are normal. He exhibits no distension.   Musculoskeletal:   rle in immobilizer, very limited rom due to pain   Neurological: He is alert and oriented to person, place, and time.   Skin: Skin is warm and dry.                                Assessment/Plan     * Pathologic fracture of femur (CMS-HCC)- (present on admission)   Assessment & Plan    Patient w/ progressive pain R knee x 2 months  Swelling x 3 days PTA  Acute pain, Fx  CT suspcious for path Fx, MRI same- ESR does not support Dx osteo  Plan Xfer [facility] when bed available (currently none)-[place], [facility], [facility], [place] have all declined the patient        Allergic drug rash   Assessment & Plan    Patient had red streak up his arm with IV Rocephin  But tolerated Omnicef for several days  Today has hives- but Flomax started yesterday  Has also been on doxycycline recently  All the above medications have been discontinued  Benadryl added  Less likely this is secondary to his pain medication-this will need to be changed if this worsens or fails to improve        Urethritis- (present on admission)   Assessment & Plan    GC, Chlamydia negative by PCR  Symptoms however are improved with Omnicef        Urinary retention- (present on admission)   Assessment & Plan    ? 2/2 urethritis- L spine OK  Pride removed-still unable to void  Urology recommended starting Flomax-now has drug rash  Trying Cardura  If tolerates remove Pride in 1-2 days-         Elevated alkaline phosphatase level- (present on admission)   Assessment & Plan    -Highly concerning for possible primary bone cancer, no hypercalcemia or kidney failure on labs           Quality-Core Measures   Reviewed items::  Labs reviewed, Radiology images reviewed and Medications reviewed  Pride catheter::  Urinary Tract Retention or Urinary Tract Obstruction  DVT prophylaxis pharmacological::  Enoxaparin (Lovenox)  Ulcer Prophylaxis::  No

## 2018-04-25 NOTE — DISCHARGE PLANNING
0805- Call placed to [facility] to follow up on status of patient transfer, spoke with Malena.  Per Malena their MD Dr. Gudino will accept patient pending insurance authorization and bed availability.  Malena requests that clinicals be faxed to [facility] ([phone]).  Fax confirmation received at 0808.

## 2018-04-26 PROBLEM — L27.0 ALLERGIC DRUG RASH: Status: RESOLVED | Noted: 2018-04-23 | Resolved: 2018-04-26

## 2018-04-26 PROBLEM — K59.00 CONSTIPATION: Status: ACTIVE | Noted: 2018-04-26

## 2018-04-26 PROCEDURE — A9270 NON-COVERED ITEM OR SERVICE: HCPCS | Performed by: INTERNAL MEDICINE

## 2018-04-26 PROCEDURE — 700102 HCHG RX REV CODE 250 W/ 637 OVERRIDE(OP): Performed by: INTERNAL MEDICINE

## 2018-04-26 PROCEDURE — 51798 US URINE CAPACITY MEASURE: CPT

## 2018-04-26 PROCEDURE — 700111 HCHG RX REV CODE 636 W/ 250 OVERRIDE (IP): Performed by: INTERNAL MEDICINE

## 2018-04-26 PROCEDURE — 99233 SBSQ HOSP IP/OBS HIGH 50: CPT | Performed by: INTERNAL MEDICINE

## 2018-04-26 PROCEDURE — 770001 HCHG ROOM/CARE - MED/SURG/GYN PRIV*

## 2018-04-26 PROCEDURE — 700101 HCHG RX REV CODE 250: Performed by: INTERNAL MEDICINE

## 2018-04-26 RX ORDER — LIDOCAINE HYDROCHLORIDE 40 MG/ML
SOLUTION TOPICAL
Status: DISCONTINUED | OUTPATIENT
Start: 2018-04-26 | End: 2018-04-26

## 2018-04-26 RX ORDER — CYCLOBENZAPRINE HCL 10 MG
5 TABLET ORAL 3 TIMES DAILY PRN
Status: DISCONTINUED | OUTPATIENT
Start: 2018-04-26 | End: 2018-04-27 | Stop reason: HOSPADM

## 2018-04-26 RX ADMIN — CYCLOBENZAPRINE 5 MG: 10 TABLET, FILM COATED ORAL at 22:38

## 2018-04-26 RX ADMIN — DIAZEPAM 2 MG: 2 TABLET ORAL at 09:16

## 2018-04-26 RX ADMIN — MORPHINE SULFATE 4 MG: 4 INJECTION INTRAVENOUS at 14:54

## 2018-04-26 RX ADMIN — ACETAMINOPHEN 1000 MG: 500 TABLET ORAL at 22:39

## 2018-04-26 RX ADMIN — ENOXAPARIN SODIUM 40 MG: 100 INJECTION SUBCUTANEOUS at 09:13

## 2018-04-26 RX ADMIN — ACETAMINOPHEN 1000 MG: 500 TABLET ORAL at 06:48

## 2018-04-26 RX ADMIN — GABAPENTIN 100 MG: 100 CAPSULE ORAL at 09:13

## 2018-04-26 RX ADMIN — MORPHINE SULFATE 4 MG: 4 INJECTION INTRAVENOUS at 06:48

## 2018-04-26 RX ADMIN — DIAZEPAM 2 MG: 2 TABLET ORAL at 19:58

## 2018-04-26 RX ADMIN — OXYCODONE HYDROCHLORIDE 10 MG: 10 TABLET ORAL at 15:31

## 2018-04-26 RX ADMIN — ACETAMINOPHEN 1000 MG: 500 TABLET ORAL at 18:16

## 2018-04-26 RX ADMIN — LIDOCAINE HYDROCHLORIDE 15 ML: 20 SOLUTION ORAL; TOPICAL at 18:16

## 2018-04-26 RX ADMIN — OXYCODONE HYDROCHLORIDE 10 MG: 10 TABLET ORAL at 20:03

## 2018-04-26 RX ADMIN — OXYCODONE HYDROCHLORIDE 10 MG: 10 TABLET ORAL at 02:50

## 2018-04-26 RX ADMIN — POLYETHYLENE GLYCOL 3350 1 PACKET: 17 POWDER, FOR SOLUTION ORAL at 11:23

## 2018-04-26 RX ADMIN — DOXAZOSIN 4 MG: 2 TABLET ORAL at 20:02

## 2018-04-26 RX ADMIN — ACETAMINOPHEN 1000 MG: 500 TABLET ORAL at 11:23

## 2018-04-26 RX ADMIN — GABAPENTIN 100 MG: 100 CAPSULE ORAL at 14:52

## 2018-04-26 RX ADMIN — MORPHINE SULFATE 4 MG: 4 INJECTION INTRAVENOUS at 18:21

## 2018-04-26 RX ADMIN — GABAPENTIN 100 MG: 100 CAPSULE ORAL at 20:03

## 2018-04-26 RX ADMIN — OXYCODONE HYDROCHLORIDE 10 MG: 10 TABLET, FILM COATED, EXTENDED RELEASE ORAL at 09:13

## 2018-04-26 RX ADMIN — OXYCODONE HYDROCHLORIDE 10 MG: 10 TABLET ORAL at 09:13

## 2018-04-26 RX ADMIN — MORPHINE SULFATE 4 MG: 4 INJECTION INTRAVENOUS at 03:55

## 2018-04-26 RX ADMIN — CYCLOBENZAPRINE 5 MG: 10 TABLET, FILM COATED ORAL at 15:31

## 2018-04-26 RX ADMIN — MAGNESIUM HYDROXIDE 30 ML: 400 SUSPENSION ORAL at 20:05

## 2018-04-26 RX ADMIN — MORPHINE SULFATE 4 MG: 4 INJECTION INTRAVENOUS at 22:38

## 2018-04-26 RX ADMIN — SENNOSIDES AND DOCUSATE SODIUM 2 TABLET: 8.6; 5 TABLET ORAL at 11:23

## 2018-04-26 RX ADMIN — SENNOSIDES AND DOCUSATE SODIUM 2 TABLET: 8.6; 5 TABLET ORAL at 20:02

## 2018-04-26 RX ADMIN — OXYCODONE HYDROCHLORIDE 10 MG: 10 TABLET, FILM COATED, EXTENDED RELEASE ORAL at 20:02

## 2018-04-26 ASSESSMENT — PAIN SCALES - GENERAL
PAINLEVEL_OUTOF10: 7
PAINLEVEL_OUTOF10: 10
PAINLEVEL_OUTOF10: 8
PAINLEVEL_OUTOF10: 10
PAINLEVEL_OUTOF10: 9
PAINLEVEL_OUTOF10: 8
PAINLEVEL_OUTOF10: 10
PAINLEVEL_OUTOF10: 6
PAINLEVEL_OUTOF10: 10
PAINLEVEL_OUTOF10: 7
PAINLEVEL_OUTOF10: 8
PAINLEVEL_OUTOF10: 8

## 2018-04-26 ASSESSMENT — ENCOUNTER SYMPTOMS
NAUSEA: 0
COUGH: 0
CHILLS: 0
VOMITING: 0
ABDOMINAL PAIN: 0
HEARTBURN: 0
DEPRESSION: 0
DIZZINESS: 0
DOUBLE VISION: 0
HEMOPTYSIS: 0
BLURRED VISION: 0
FEVER: 0
CONSTIPATION: 1
PALPITATIONS: 0
HEADACHES: 0

## 2018-04-26 NOTE — DISCHARGE PLANNING
1138: Spoke w/ Dr. Sly Navas, ortho-onc in [place], [place] ([phone]). He will accept pt as consulting surgeon at [facility]. He will need a hospitalist to admit. He does not need to consult with the ortho here at [facility].   1200: Notified Lorenzo Mcnair RN at [facility]. She will contact her superiors and get back to us regarding transportation, authorization, etc.  1209: Spoke w/ Vanessa at the [facility] center [phone]. All requested documentation faxed to [phone]. She will page out their hospitalist to call Dr. Galan (he is aware).

## 2018-04-26 NOTE — CARE PLAN
"Problem: Bowel/Gastric:  Goal: Will not experience complications related to bowel motility  Outcome: PROGRESSING SLOWER THAN EXPECTED  Pt offered the next step of bowel protocol, Miralax. Pt states, \" I just want to wait for morning. I don't want to have to go in the middle of the night\". Pt educated on narcotic use and constipation.     Problem: Knowledge Deficit  Goal: Knowledge of disease process/condition, treatment plan, diagnostic tests, and medications will improve  Outcome: PROGRESSING AS EXPECTED   Reviewed POC including safety, mobility, pain management, medication administration, DVT prophylaxis, and skin integrity.      "

## 2018-04-26 NOTE — DISCHARGE PLANNING
This writer spoke w/ EMIR Hartley at [facility] & received a call from Haydee,  at [facility] re transfer to [facility], who spoke w/ rocco Lopez at [facility]. Request has been denied for out of state transfer.    TC will attempt to arrange contact with Ortho-Oncologist Dr. Sly Navas in [place] and connect with the sending MD here at [facility]. [facility] medical personnel state that this would be an acceptable alternative treatment plan.    WENDY Neumann, [phone] updated. Apparently someone from the longterm contacted the ortho floor after hours last evening and relayed this info but documentation is not seen.

## 2018-04-26 NOTE — DISCHARGE PLANNING
Call from financial ulba Girard at [facility]. She is attempting to contact the correct people at the [place] correction system in order to initiate authorization & financial contractiing. Numbers for INEZ Marsh, Lorenzo FIELD @ [place] and Farhana Art Chief of Nursing for [place] given.

## 2018-04-26 NOTE — PROGRESS NOTES
"[facility] Hospitalist Progress Note    Date of Service: 4/26/2018    Chief Complaint  37 y.o. male admitted 4/16/2018 with 2 month h/o r knee pain and sudden increased pain with pathologic fracture of distal R femur.    Interval Problem Update  4/17: Comminuted angulated displaced distal right femoral fracture.  Suspect underlying malignant bone lesion.  4/24: Patient has pain from rle pathological fx site. Recommendation is to f/u with ortho oncology and he is pending transfer.   4/25: ortho here spoke to  ([facility]) who has accepted to evaluate patient. Per cm, they informed [facility] about the pending transfer and a decision will be made by tomorrow. Also discussed current pain regimen and the need to modify it due to risk of overmedicating. His pain is relatively well controlled. No fevers, or other issues. Patient's last bm 3 days ago. [place] estrada  4/26: per staff patient cannot be transferred to [facility] due to being a prisoner in [place], [facility] would need to assume responsibility. Instead we are trying to make contact with ortho-onc () in [place] for evaluation. Patient complaining 8/10 pain in the rle area, also says \" he has squeezing pain around the knee join\", he is having hard stools from opiate use as well. Additionally requiring estrada for urine retention.     Consultants/Specialty  ortho    Disposition  Pending transfer to [facility] ([facility])        Review of Systems   Constitutional: Negative for chills and fever.   HENT: Negative for hearing loss and tinnitus.    Eyes: Negative for blurred vision and double vision.   Respiratory: Negative for cough and hemoptysis.    Cardiovascular: Negative for chest pain and palpitations.   Gastrointestinal: Positive for constipation. Negative for abdominal pain, heartburn, nausea and vomiting.   Genitourinary: Negative for dysuria.        Urine retention   Musculoskeletal: Positive for joint pain.        Severe pain in the " right extremity   Skin: Negative for rash.   Neurological: Negative for dizziness and headaches.   Psychiatric/Behavioral: Negative for depression and suicidal ideas.      Physical Exam  Laboratory/Imaging   Hemodynamics  Temp (24hrs), Av.7 °C (98 °F), Min:36.6 °C (97.8 °F), Max:36.8 °C (98.3 °F)   Temperature: 36.6 °C (97.8 °F)  Pulse  Av.7  Min: 62  Max: 105    Blood Pressure: 125/83      Respiratory      Respiration: 16, Pulse Oximetry: 97 %             Fluids    Intake/Output Summary (Last 24 hours) at 18 1505  Last data filed at 18 0800   Gross per 24 hour   Intake              480 ml   Output              650 ml   Net             -170 ml       Nutrition  Orders Placed This Encounter   Procedures   • DIET ORDER     Standing Status:   Standing     Number of Occurrences:   1     Order Specific Question:   Diet:     Answer:   Regular [1]     Physical Exam   Constitutional: He is oriented to person, place, and time. No distress.   Young male, prisoner, here for pathological bone fx   HENT:   Head: Atraumatic.   Eyes: Pupils are equal, round, and reactive to light.   Neck: Neck supple.   Cardiovascular: Normal rate, regular rhythm and normal heart sounds.    No murmur heard.  Pulmonary/Chest: Effort normal and breath sounds normal. No respiratory distress.   Abdominal: Soft. Bowel sounds are normal.   Urinary retention, requiring estrada   Musculoskeletal: Normal range of motion. He exhibits no edema.   rle immobilized   Neurological: He is alert and oriented to person, place, and time.   Skin: Skin is warm and dry.   Abdomen--surgical scar old                                Assessment/Plan     * Pathologic fracture of femur (CMS-HCC)- (present on admission)   Assessment & Plan    Patient w/ progressive pain R knee x 2 months  Swelling x 3 days PTA  Acute pain, Fx  CT suspcious for path Fx, MRI same- ESR does not support Dx osteo    Continue rle nwb, immobilization  Change pain regimen to morphine  4mg q3hrs prn for breakthrough, do oxycodone 10mg every 6hrs, add gabapentin 100 tid  Add flexeril tid  Lidocaine gel to the knee joint area for local relief    - anticipating transfer to [place] to be evaluated by  (ortho-onc)          Urethritis   Assessment & Plan    GC, Chlamydia negative by PCR  Symptoms however are improved with Omnicef        Urinary retention- (present on admission)   Assessment & Plan    Persistent retention 2/2 unclear mechanism   Requiring estrada  On cardura  Will need op urology evaluation        Elevated alkaline phosphatase level- (present on admission)   Assessment & Plan    -Highly concerning for possible primary bone cancer, no hypercalcemia or kidney failure on labs        Constipation   Assessment & Plan    Due to narcs  Need to be on strict bowel regimen, discussed with rn          Quality-Core Measures   Reviewed items::  Labs reviewed, Radiology images reviewed and Medications reviewed  Estrada catheter::  Urinary Tract Retention or Urinary Tract Obstruction

## 2018-04-26 NOTE — PROGRESS NOTES
Assumed care of pt 1845. Pt resting in bed. Two officers at bedside as pt is an inmate.  Reviewed POC including safety, mobility, pain management, medication administration, DVT prophylaxis, and skin integrity. Call light within reach. Pt calls appropriately. Hourly rounding in place. Pt provided extra meal from kitchen. Pt has no needs or concerns at this time.

## 2018-04-26 NOTE — CARE PLAN
Problem: Safety  Goal: Will remain free from falls    Intervention: Assess risk factors for falls  Low risk for falls. Guards at bedside.      Problem: Pain Management  Goal: Pain level will decrease to patient's comfort goal    Intervention: Follow pain managment plan developed in collaboration with patient and Interdisciplinary Team  Scheduled oxycodone and prn morphine, resting comfortably in bed.

## 2018-04-26 NOTE — PROGRESS NOTES
Bladder scan reveals 650. Contacted Dr. Rodríguez hospitalist on call to obtain an order for FC per the protocol. Per Dr. Rodríguez, to do the straight cath for now.    Bladder emptied through a Straight cath done c/o Tuan WILEY. 675 ml out.

## 2018-04-26 NOTE — DISCHARGE PLANNING
Anticipated Discharge Disposition: [facility] center.    Action: Bedside nurse informed LSW that DON from the correction contacted her and said the correction will not authorize [facility], and requested an Orthopedic [place] doctor in .  LSW contacted the transfer center and notified them.  Transfer center contacted a doctor Sly Navas in  to have a peer to peer with Dr. Galan.  The transfer center is following.      Barriers to Discharge: penitentiary is unwilling to authorize CA center outside of [place].      Plan: LSW will continue to follow DC planning with the transfer center.

## 2018-04-26 NOTE — DISCHARGE PLANNING
Anticipated Discharge Disposition: [facility] specialty in [place]    Action:Referral in process, awaiting final auth and transport to be arranged by care home personnel    Barriers to Discharge: incarcerated    Plan: [place] to [facility] specialist in [place].

## 2018-04-26 NOTE — CARE PLAN
Problem: Safety  Goal: Will remain free from falls  Low fall risk, calls as needed.    Problem: Pain Management  Goal: Pain level will decrease to patient's comfort goal    Intervention: Follow pain managment plan developed in collaboration with patient and Interdisciplinary Team  Pain control variable with oxycodone, diazepam and IV morphine as needed. Discussed with MD and new orders received and implemented.

## 2018-04-27 VITALS
OXYGEN SATURATION: 100 % | SYSTOLIC BLOOD PRESSURE: 140 MMHG | HEIGHT: 78 IN | TEMPERATURE: 98.3 F | RESPIRATION RATE: 20 BRPM | HEART RATE: 92 BPM | DIASTOLIC BLOOD PRESSURE: 98 MMHG | BODY MASS INDEX: 21.4 KG/M2 | WEIGHT: 185 LBS

## 2018-04-27 PROCEDURE — 700111 HCHG RX REV CODE 636 W/ 250 OVERRIDE (IP): Performed by: INTERNAL MEDICINE

## 2018-04-27 PROCEDURE — A9270 NON-COVERED ITEM OR SERVICE: HCPCS | Performed by: INTERNAL MEDICINE

## 2018-04-27 PROCEDURE — 99239 HOSP IP/OBS DSCHRG MGMT >30: CPT | Performed by: INTERNAL MEDICINE

## 2018-04-27 PROCEDURE — 700102 HCHG RX REV CODE 250 W/ 637 OVERRIDE(OP): Performed by: INTERNAL MEDICINE

## 2018-04-27 PROCEDURE — 700101 HCHG RX REV CODE 250: Performed by: INTERNAL MEDICINE

## 2018-04-27 RX ORDER — CYCLOBENZAPRINE HCL 5 MG
5 TABLET ORAL 3 TIMES DAILY PRN
Qty: 30 TAB | Refills: 0 | Status: SHIPPED | OUTPATIENT
Start: 2018-04-27

## 2018-04-27 RX ORDER — GABAPENTIN 100 MG/1
100 CAPSULE ORAL 3 TIMES DAILY
Qty: 90 CAP
Start: 2018-04-27

## 2018-04-27 RX ORDER — DIPHENHYDRAMINE HCL 25 MG
25 TABLET ORAL EVERY 6 HOURS PRN
Qty: 30 TAB | Refills: 0 | Status: SHIPPED | OUTPATIENT
Start: 2018-04-27

## 2018-04-27 RX ORDER — AMOXICILLIN 250 MG
2 CAPSULE ORAL 2 TIMES DAILY
Qty: 30 TAB | Refills: 0 | Status: SHIPPED | OUTPATIENT
Start: 2018-04-27

## 2018-04-27 RX ORDER — ONDANSETRON 2 MG/ML
4 INJECTION INTRAMUSCULAR; INTRAVENOUS EVERY 4 HOURS PRN
Qty: 84 ML
Start: 2018-04-27

## 2018-04-27 RX ORDER — BISACODYL 10 MG
10 SUPPOSITORY, RECTAL RECTAL
Refills: 0
Start: 2018-04-27

## 2018-04-27 RX ORDER — DOXAZOSIN MESYLATE 4 MG/1
4 TABLET ORAL
Qty: 30 TAB
Start: 2018-04-27

## 2018-04-27 RX ORDER — MORPHINE SULFATE 4 MG/ML
4 INJECTION, SOLUTION INTRAMUSCULAR; INTRAVENOUS
Start: 2018-04-27 | End: 2018-05-04

## 2018-04-27 RX ORDER — ACETAMINOPHEN 325 MG/1
650 TABLET ORAL EVERY 6 HOURS PRN
Qty: 30 TAB | Refills: 0 | Status: SHIPPED | OUTPATIENT
Start: 2018-04-27

## 2018-04-27 RX ORDER — POLYETHYLENE GLYCOL 3350 17 G/17G
17 POWDER, FOR SOLUTION ORAL
Refills: 3
Start: 2018-04-27

## 2018-04-27 RX ORDER — OXYCODONE HYDROCHLORIDE 10 MG/1
10 TABLET ORAL EVERY 6 HOURS PRN
Qty: 28 TAB
Start: 2018-04-27 | End: 2018-05-04

## 2018-04-27 RX ADMIN — MORPHINE SULFATE 4 MG: 4 INJECTION INTRAVENOUS at 03:13

## 2018-04-27 RX ADMIN — OXYCODONE HYDROCHLORIDE 10 MG: 10 TABLET ORAL at 04:58

## 2018-04-27 RX ADMIN — MORPHINE SULFATE 4 MG: 4 INJECTION INTRAVENOUS at 06:30

## 2018-04-27 RX ADMIN — GABAPENTIN 100 MG: 100 CAPSULE ORAL at 08:23

## 2018-04-27 RX ADMIN — SENNOSIDES AND DOCUSATE SODIUM 2 TABLET: 8.6; 5 TABLET ORAL at 08:23

## 2018-04-27 RX ADMIN — OXYCODONE HYDROCHLORIDE 10 MG: 10 TABLET, FILM COATED, EXTENDED RELEASE ORAL at 08:23

## 2018-04-27 RX ADMIN — DIAZEPAM 2 MG: 2 TABLET ORAL at 03:09

## 2018-04-27 RX ADMIN — MORPHINE SULFATE 4 MG: 4 INJECTION INTRAVENOUS at 09:28

## 2018-04-27 RX ADMIN — LIDOCAINE HYDROCHLORIDE 15 ML: 20 SOLUTION ORAL; TOPICAL at 09:27

## 2018-04-27 RX ADMIN — ACETAMINOPHEN 1000 MG: 500 TABLET ORAL at 06:30

## 2018-04-27 RX ADMIN — ENOXAPARIN SODIUM 40 MG: 100 INJECTION SUBCUTANEOUS at 08:23

## 2018-04-27 ASSESSMENT — PAIN SCALES - GENERAL
PAINLEVEL_OUTOF10: 7
PAINLEVEL_OUTOF10: 7
PAINLEVEL_OUTOF10: 10
PAINLEVEL_OUTOF10: 10
PAINLEVEL_OUTOF10: ASSUMED PAIN PRESENT
PAINLEVEL_OUTOF10: ASSUMED PAIN PRESENT
PAINLEVEL_OUTOF10: 10
PAINLEVEL_OUTOF10: 9

## 2018-04-27 NOTE — DISCHARGE PLANNING
Anticipated Discharge Disposition: [facility] Center    Action: Pt has been medically cleared for transfer to [facility] Center. Pt has been accepted to [facility] in .  LSW updated, bedside RN, and completed the transfer packet.      Barriers to Discharge: None    Plan: Pt is medically clear to transfer to [facility] Center in .

## 2018-04-27 NOTE — DISCHARGE INSTRUCTIONS
Discharge Instructions    Discharged to other by medical transportation with escort. Discharged via wheelchair, hospital escort: Yes.  Special equipment needed: Immobilizer    Be sure to schedule a follow-up appointment with your primary care doctor or any specialists as instructed.     Discharge Plan:   Diet Plan: Discussed  Activity Level: Discussed  Confirmed Follow up Appointment: No (Comments)  Confirmed Symptoms Management: Discussed  Medication Reconciliation Updated: Yes    I understand that a diet low in cholesterol, fat, and sodium is recommended for good health. Unless I have been given specific instructions below for another diet, I accept this instruction as my diet prescription.   Other diet: Regular Diet    Special Instructions: Discharge instructions for the Orthopedic Patient    Follow up with Primary Care Physician within 2 weeks of discharge to home, regarding:  Review of medications and diagnostic testing.  Surveillance for medical complications.  Workup and treatment of osteoporosis, if appropriate.     -Is this a Joint Replacement patient? No    -Is this patient being discharged with medication to prevent blood clots?  Yes, Lovenox Enoxaparin injection  What is this medicine?  ENOXAPARIN (ee nox a PA rin) is used after knee, hip, or abdominal surgeries to prevent blood clotting. It is also used to treat existing blood clots in the lungs or in the veins.  This medicine may be used for other purposes; ask your health care provider or pharmacist if you have questions.  COMMON BRAND NAME(S): Lovenox  What should I tell my health care provider before I take this medicine?  They need to know if you have any of these conditions:  -bleeding disorders, hemorrhage, or hemophilia  -infection of the heart or heart valves  -kidney or liver disease  -previous stroke  -prosthetic heart valve  -recent surgery or delivery of a baby  -ulcer in the stomach or intestine, diverticulitis, or other bowel disease  -an  unusual or allergic reaction to enoxaparin, heparin, pork or pork products, other medicines, foods, dyes, or preservatives  -pregnant or trying to get pregnant  -breast-feeding  How should I use this medicine?  This medicine is for injection under the skin. It is usually given by a health-care professional. You or a family member may be trained on how to give the injections. If you are to give yourself injections, make sure you understand how to use the syringe, measure the dose if necessary, and give the injection. To avoid bruising, do not rub the site where this medicine has been injected. Do not take your medicine more often than directed. Do not stop taking except on the advice of your doctor or health care professional.  Make sure you receive a puncture-resistant container to dispose of the needles and syringes once you have finished with them. Do not reuse these items. Return the container to your doctor or health care professional for proper disposal.  Talk to your pediatrician regarding the use of this medicine in children. Special care may be needed.  Overdosage: If you think you have taken too much of this medicine contact a poison control center or emergency room at once.  NOTE: This medicine is only for you. Do not share this medicine with others.  What if I miss a dose?  If you miss a dose, take it as soon as you can. If it is almost time for your next dose, take only that dose. Do not take double or extra doses.  What may interact with this medicine?  -aspirin and aspirin-like medicines  -certain medicines that treat or prevent blood clots  -dipyridamole  -NSAIDs, medicines for pain and inflammation, like ibuprofen or naproxen  This list may not describe all possible interactions. Give your health care provider a list of all the medicines, herbs, non-prescription drugs, or dietary supplements you use. Also tell them if you smoke, drink alcohol, or use illegal drugs. Some items may interact with your  medicine.  What should I watch for while using this medicine?  Visit your doctor or health care professional for regular checks on your progress. Your condition will be monitored carefully while you are receiving this medicine.  Notify your doctor or health care professional and seek emergency treatment if you develop breathing problems; changes in vision; chest pain; severe, sudden headache; pain, swelling, warmth in the leg; trouble speaking; sudden numbness or weakness of the face, arm, or leg. These can be signs that your condition has gotten worse.  If you are going to have surgery, tell your doctor or health care professional that you are taking this medicine.  Do not stop taking this medicine without first talking to your doctor. Be sure to refill your prescription before you run out of medicine.  Avoid sports and activities that might cause injury while you are using this medicine. Severe falls or injuries can cause unseen bleeding. Be careful when using sharp tools or knives. Consider using an electric razor. Take special care brushing or flossing your teeth. Report any injuries, bruising, or red spots on the skin to your doctor or health care professional.  What side effects may I notice from receiving this medicine?  Side effects that you should report to your doctor or health care professional as soon as possible:  -allergic reactions like skin rash, itching or hives, swelling of the face, lips, or tongue  -feeling faint or lightheaded, falls  -signs and symptoms of bleeding such as bloody or black, tarry stools; red or dark-brown urine; spitting up blood or brown material that looks like coffee grounds; red spots on the skin; unusual bruising or bleeding from the eye, gums, or nose  Side effects that usually do not require medical attention (report to your doctor or health care professional if they continue or are bothersome):  -pain, redness, or irritation at site where injected  This list may not  describe all possible side effects. Call your doctor for medical advice about side effects. You may report side effects to FDA at 6-671-OHC-1791.  Where should I keep my medicine?  Keep out of the reach of children.  Store at room temperature between 15 and 30 degrees C (59 and 86 degrees F). Do not freeze. If your injections have been specially prepared, you may need to store them in the refrigerator. Ask your pharmacist. Throw away any unused medicine after the expiration date.  NOTE: This sheet is a summary. It may not cover all possible information. If you have questions about this medicine, talk to your doctor, pharmacist, or health care provider.  © 2018 Elsevier/Gold Standard (2015-04-21 16:06:21)      · Is patient discharged on Warfarin / Coumadin?   No     Depression / Suicide Risk    As you are discharged from this [facility] facility, it is important to learn how to keep safe from harming yourself.    Recognize the warning signs:  · Abrupt changes in personality, positive or negative- including increase in energy   · Giving away possessions  · Change in eating patterns- significant weight changes-  positive or negative  · Change in sleeping patterns- unable to sleep or sleeping all the time   · Unwillingness or inability to communicate  · Depression  · Unusual sadness, discouragement and loneliness  · Talk of wanting to die  · Neglect of personal appearance   · Rebelliousness- reckless behavior  · Withdrawal from people/activities they love  · Confusion- inability to concentrate     If you or a loved one observes any of these behaviors or has concerns about self-harm, here's what you can do:  · Talk about it- your feelings and reasons for harming yourself  · Remove any means that you might use to hurt yourself (examples: pills, rope, extension cords, firearm)  · Get professional help from the community (Mental Health, Substance Abuse, psychological counseling)  · Do not be alone:Call your Safe Contact-  someone whom you trust who will be there for you.  · Call your local CRISIS HOTLINE 722-8885 or 314-090-9219  · Call your local Children's Mobile Crisis Response Team [place] (107) 372-8222 or www.WillCall  · Call the toll free National Suicide Prevention Hotlines   · National Suicide Prevention Lifeline 281-889-OINE (0444)  · [facility] 800-SUICIDE (749-4081)      Femoral Shaft Fracture  Introduction  A femoral shaft fracture is a break (fracture) in the shaft of the thigh bone (femur). The femur is the long bone that connects the hip joint to the knee joint. Most femoral shaft fractures are closed fractures. A closed fracture is a break in a bone that happens without any cuts (lacerations) through the skin that is near the fracture site. Some femoral shaft fractures are open fractures. An open fracture is a break in a bone that happens along with lacerations through the skin that is near the fracture site.  What are the causes?  A healthy femur may break from a forceful impact, such as from:  · A fall, especially from a great height.  · A high-impact sports injury.  · A car or motorcycle accident.  A weakened femur may break from minimal impact or force due to:  · Certain medical conditions.  · Age.  What increases the risk?  This condition is more likely to develop in:  · Older people.  · People who have certain medical conditions that cause bones to become weak or thin, such as:  ¨ Osteoporosis.  ¨ Cancer.  ¨ Osteogenesis imperfecta. This is a condition that involves bone weakness that is due to abnormal bone development.  · People who take certain medicines (bisphosphonates) that are used to treat osteoporosis.  · People who participate in high-risk sports or impact sports.  What are the signs or symptoms?  Symptoms of this condition include:  · Severe pain.  · Inability to walk.  · Bruising.  · Swelling or visible deformity of the leg.  · Substantial bleeding, if it is an open  fracture.  How is this diagnosed?  This condition is diagnosed based on your symptoms and a physical exam. You may also have other tests, including:  · X-rays of the femur. Since the force required to break a healthy femur can break other bones, X-rays are often taken of the hip, knee, and pelvis as well.  · Evaluation of the blood vessels with specialized X-rays (arteriogram).  · Evaluation of the nerves in the area of the break.  · CT scan or MRI.  How is this treated?  A femoral shaft fracture usually requires surgery. You may have one or more of the following surgical treatments:  · External fixation. This involves using pins and screws to hold the bones in place if there is extensive soft tissue injury. After some time, you may need an additional surgical treatment, such as intramedullary nailing.  · Intramedullary nailing. This involves inserting a anthony (intramedullary nail) through an incision. The intramedullary nail goes down the center of the shaft of the femur. It may be inserted in the knee joint or from the top of the femur near the hip. Generally, screws are placed through the anthony at both ends to prevent shortening or rotation of the femur as it heals.  · Plates to stabilize the fracture. These may be used, especially when the fracture is at either end of the bone, near the hip or the knee.  In rare cases for which surgery is not an option, a cast or a splint may be used to hold (immobilize) the bone while it heals.  How is this prevented?  If factors such as certain medical conditions or age increase your risk for another femoral shaft fracture, you may be able to prevent one if you follow these instructions:  · Use aids for walking, such as a walker or cane, as directed by your health care provider.  · Follow instructions from your health care provider about how to strengthen your bones if you have osteoporosis.  This information is not intended to replace advice given to you by your health care  provider. Make sure you discuss any questions you have with your health care provider.  Document Released: 09/27/2006 Document Revised: 05/25/2017 Document Reviewed: 08/03/2015  © 2017 Elsevier

## 2018-04-27 NOTE — PROGRESS NOTES
Patient discharged to [facility] in [place] with EDY. Pride in place and patent. #20G IV to right forearm. Report given to edy RN and Belem pinedo RN at [place]. Discharge packet sent with patient.

## 2018-04-27 NOTE — PROGRESS NOTES
Assumed care of pt 1845. Pt resting in bed. Two guards at bedside. Reviewed POC including safety, mobility, pain management, medication administration, DVT prophylaxis, and future surgery. Pt complaining of constipation. Pt given Mil of Magnesia per MAR. Call light within reach. Pt calls appropriately. Hourly rounding in place. Pt has no needs or concerns at this time.

## 2018-04-27 NOTE — DISCHARGE SUMMARY
CHIEF COMPLAINT ON ADMISSION  Chief Complaint   Patient presents with   • Leg Injury       CODE STATUS  Full Code    HPI & HOSPITAL COURSE  This is a 37 y.o. year old male here with right lower extremity pain. Patient is a prisoner at [place], presented with ongoing right knee pain for almost 2 months. He was seen by a physician and dx with meniscal tear. The pain persisted and the day prior to admission here at [facility], he was sitting on his top bunk cross legged, and tried to uncross, change position then felt a snap along with excruciating pain. Otherwise no history of trauma or fall to explain the pain. X-rays showed a right distal femur fracture, with possible permeative destructive lesion suspcious for pathologic process.  He was seen by ortho (), who was obviously suspecting pathological fracture and wanted to evaluate with a CT R femur.    Per CT R femur, showed    FINDINGS:  There is a comminuted multi fragmented spiral fracture through the distal diaphysis of the right femur.  There is apex anterior angulation. There is 12.9 mm lateral offset of the proximal diaphyseal fragment.  There is permeative destruction of the femoral cortices at the level of the fracture. Periosteal reaction and intramedullary lucency also demonstrated.  No other fractures identified. No hip joint dislocation.  Small knee joint effusion.      MRi Right femur showed:    1.  Distal femoral diaphyseal fracture with impaction and some displacement and comminution. There is intramedullary marrow signal alteration which fills the marrow cavity at the fracture site over a linear dimension of 10 cm. There is a second lesion seen involving the distal femur anteriorly just above the trochlear groove which measures 3 cm in size. Due to the acute fracture, it is difficult to characterize this lesion due to the acute fracture however differential considerations include primary bone neoplasm, multiple myeloma, metastatic disease and  infectious process.    2.  Extensive edema and enhancement of the soft tissues surrounding the fracture site and extending into the surrounding muscles with some areas of rim-enhancing fluid. This may be reactive change related to the fracture versus presence of an infectious process. Edema extends proximally along the course of the thigh muscles.    Given above findings ortho recommended evaluation by ortho-oncology specialist. Of note he was also seen by ID (), who agreed with our concern for neoplastic process causing pathological fracture and didn't think there was a bone infection. Other problems included urinary retention, and patient had some urethral discharge, had negative chlamydia, n.gonorrhoeae, hiv, rpr tests. He did receive abx's (cefdinirand, doxy) for 7 days. Ortho continued nwb status of RLE, kept leg in an immobilizer, and his pain was controlled with narcotics. During hospitalization he continued to have urinary retention, requiring estrada, and additionally treated with cardura. At this point patient's pain is persistent and he is need of surgical intervention for relief. We made attempts to transfer patient to [facility], [place], [place], who declined patient and [facility] was the only place that accepted him. However due to logistics patient being a [place] prisoner, the transfer process would be difficult. Patient is now expected to see ortho-oncology () at [facility]. He is currently in stable state, will continue RLE immobilizer, and needs to have a bone scan, additional scans (ct chest/abd/pelvis) for staging.         Therefore, he is discharged in fair and stable condition for further post-acute management.     SPECIFIC OUTPATIENT FOLLOW-UP  See oncology following orthopedic intervention  Remove estrada in 7-10 days, initiate voiding trials, f/u with urology if retention persists    DISCHARGE PROBLEM LIST  Principal Problem:    Pathologic fracture of femur (CMS-HCC) POA:  Yes  Active Problems:    Constipation POA: Unknown    Elevated alkaline phosphatase level POA: Yes    Urinary retention POA: Yes  Resolved Problems:    Allergic drug rash POA: No      FOLLOW UP  No future appointments.  No follow-up provider specified.    MEDICATIONS ON DISCHARGE          Medication List      START taking these medications      Instructions   acetaminophen 325 MG Tabs  Commonly known as:  TYLENOL   Take 2 Tabs by mouth every 6 hours as needed (Mild Pain; (Pain scale 1-3); Temp greater than 100.5 F).  Dose:  650 mg     bisacodyl 10 MG Supp  Commonly known as:  DULCOLAX   Insert 1 Suppository in rectum 1 time daily as needed (if magnesium hydroxide ineffective after 24 hours).  Dose:  10 mg     cyclobenzaprine 5 MG tablet  Commonly known as:  FLEXERIL   Take 1 Tab by mouth 3 times a day as needed for Muscle Spasms.  Dose:  5 mg     diphenhydrAMINE 25 MG Tabs  Commonly known as:  BENADRYL   Take 1 tablet by mouth every 6 hours as needed for Itching or Sleep.  Dose:  25 mg     doxazosin 4 MG Tabs  Commonly known as:  CARDURA   Take 1 Tab by mouth every bedtime.  Dose:  4 mg     enoxaparin 40 MG/0.4ML Soln inj  Commonly known as:  LOVENOX   Inject 40 mg as instructed every day.  Dose:  40 mg     gabapentin 100 MG Caps  Commonly known as:  NEURONTIN   Take 1 Cap by mouth 3 times a day.  Dose:  100 mg     magnesium hydroxide 400 MG/5ML Susp  Commonly known as:  MILK OF MAGNESIA   Take 30 mL by mouth 1 time daily as needed (if polyethylene glycol ineffective after 24 hours).  Dose:  30 mL     morphine (pf) 4 mg/ml 4 MG/ML Soln   1 mL by Intravenous route every 3 hours as needed for up to 7 days.  Dose:  4 mg     ondansetron 4 MG/2ML Soln injection  Commonly known as:  ZOFRAN   2 mL by Intravenous route every four hours as needed for Nausea.  Dose:  4 mg     oxyCODONE immediate release 10 MG immediate release tablet  Commonly known as:  ROXICODONE   Take 1 Tab by mouth every 6 hours as needed for Moderate  Pain for up to 7 days.  Dose:  10 mg     polyethylene glycol/lytes Pack  Commonly known as:  MIRALAX   Take 1 Packet by mouth 1 time daily as needed (if sennosides and docusate ineffective after 24 hours).  Dose:  17 g     senna-docusate 8.6-50 MG Tabs  Commonly known as:  PERICOLACE or SENOKOT S   Take 2 Tabs by mouth 2 Times a Day.  Dose:  2 Tab        CONTINUE taking these medications      Instructions   multivitamin Tabs   Take 1 Tab by mouth every day.  Dose:  1 Tab            DIET  Orders Placed This Encounter   Procedures   • DIET ORDER     Standing Status:   Standing     Number of Occurrences:   1     Order Specific Question:   Diet:     Answer:   Regular [1]       ACTIVITY  RLE non weight bearing, continue immobilizer  Class 4 - symptoms at rest.    LINES, DRAINS, AND WOUNDS  This is an automated list. Peripheral IVs will be removed prior to discharge.  PIV Group Right Forearm 20g Flexible Catheter (Active)   Line Secured Taped;Transparent 4/26/2018  8:01 PM   Site Condition / Description Assessed;Patent;Clean;Dry;Intact 4/26/2018  8:01 PM   Dressing Type / Description Transparent;Clean;Dry;Intact 4/26/2018  8:01 PM   Dressing Status Observed 4/26/2018  8:01 PM   Saline Locked Yes 4/26/2018  8:01 PM   Infiltration Grading Used by [facility] 0 4/26/2018  8:01 PM   Phlebitis Scale (Used by [facility]) 0 4/26/2018  8:01 PM     Urinary Catheter Indwelling Catheter 16 (Active)   Catheter State Beltrami 4/26/2018  3:00 PM   Skin Integrity Intact 4/26/2018  3:00 PM   Catheter Secured Yes 4/26/2018  3:00 PM   Collection Device Changed No 4/26/2018  3:00 PM   Output (mL) 1600 mL 4/27/2018  3:00 AM                Urinary Catheter Indwelling Catheter 16 (Active)   Catheter State Beltrami 4/26/2018  3:00 PM   Skin Integrity Intact 4/26/2018  3:00 PM   Catheter Secured Yes 4/26/2018  3:00 PM   Collection Device Changed No 4/26/2018  3:00 PM   Output (mL) 1600 mL 4/27/2018  3:00 AM        MENTAL STATUS ON TRANSFER  Level  of Consciousness: Alert  Orientation : Oriented x 4  Speech: Speech Clear    CONSULTATIONS  Ortho (), ID ()    PROCEDURES  NM-BONE SCAN WHOLE BODY   Final Result      1.  There is uptake in the pathologic fracture of the distal right femur.   2.  There are no other abnormal areas of skeletal uptake.      CT-CHEST,ABDOMEN,PELVIS WITH   Final Result      1.  There is no evidence of malignancy in the chest, abdomen, or pelvis.   2.  Lobulated hypodense superior hepatic lesion is probably a cyst.   3.  There is no adenopathy in the chest, abdomen, or pelvis.      CT-EXTREMITY, LOWER W/O LEFT   Final Result   Addendum 1 of 1   Addendum created on 4/27/2018: - [facility]      Disregard this report as it was ordered incorrectly. Please refer to    accession number [id number] for correct order and report.      Final      Comminuted angulated displaced distal right femoral fracture.   Suspect underlying malignant bone lesion.      CT-CTA LOWER EXT WITH & W/O-POST PROCESS RIGHT   Final Result      Comminuted angulated displaced distal right femoral fracture.   Suspect underlying malignant bone lesion.      MR-FEMUR-WITH & W/O RIGHT   Final Result      1.  Distal femoral diaphyseal fracture with impaction and some displacement and comminution. There is intramedullary marrow signal alteration which fills the marrow cavity at the fracture site over a linear dimension of 10 cm. There is a second lesion    seen involving the distal femur anteriorly just above the trochlear groove which measures 3 cm in size. Due to the acute fracture, it is difficult to characterize this lesion due to the acute fracture however differential considerations include primary    bone neoplasm, multiple myeloma, metastatic disease and infectious process.      2.  Extensive edema and enhancement of the soft tissues surrounding the fracture site and extending into the surrounding muscles with some areas of rim-enhancing fluid. This may be  reactive change related to the fracture versus presence of an infectious    process. Edema extends proximally along the course of the thigh muscles.      MR-LUMBAR SPINE-WITH & W/O   Final Result         1. MRI OF THE LUMBAR SPINE WITHOUT AND WITH CONTRAST WITHIN NORMAL LIMITS.      OUTSIDE IMAGES-DX LOWER EXTREMITY, RIGHT   Final Result          LABORATORY  Lab Results   Component Value Date/Time    SODIUM 137 04/17/2018 03:57 AM    POTASSIUM 4.2 04/17/2018 03:57 AM    CHLORIDE 105 04/17/2018 03:57 AM    CO2 29 04/17/2018 03:57 AM    GLUCOSE 112 (H) 04/17/2018 03:57 AM    BUN 14 04/17/2018 03:57 AM    CREATININE 1.07 04/17/2018 03:57 AM        Lab Results   Component Value Date/Time    WBC 8.8 04/17/2018 03:57 AM    HEMOGLOBIN 13.1 (L) 04/17/2018 03:57 AM    HEMATOCRIT 40.1 (L) 04/17/2018 03:57 AM    PLATELETCT 183 04/17/2018 03:57 AM        Total time of the discharge process exceeds 70 minutes.    Spoke to  at [facility] who accepted the patient. Will arrange for transfer from [facility].

## 2018-04-27 NOTE — DISCHARGE PLANNING
Transfer to [facility] arranged  [facility], ETA to bedside 1030  [facility], [address] [place]  [address].  Room 331  Accepting: Dr. Miriam Fritz  DC summary complete. Dr. Galan has reported off.  WENDY Neumann updated

## 2018-04-27 NOTE — CARE PLAN
Problem: Communication  Goal: The ability to communicate needs accurately and effectively will improve  Outcome: PROGRESSING AS EXPECTED  Report given bedside. Pt encouraged to voice needs and concerns. Pt calls for help appropriately on call light. Call light with in reach. Hourly rounding in place.        Problem: Venous Thromboembolism (VTW)/Deep Vein Thrombosis (DVT) Prevention:  Goal: Patient will participate in Venous Thrombosis (VTE)/Deep Vein Thrombosis (DVT)Prevention Measures  Outcome: PROGRESSING AS EXPECTED  SCDs to BLE on and Lovenox given for DVT prophylaxis.

## 2018-04-27 NOTE — DISCHARGE PLANNING
Transfer to [facility] planned for this morning. Spoke to Bartolome at [employer]. They are hoping for ETA to bedside approx [phone] this AM depending on flight team. Hospital is working on bed assignment but they assure me that a bed is being reserved. Their transfer center is paging out their hospitalist to contact Dr. Galan for report. Dr. Galan has been alerted to this and asked to dictate dc summary ASAP. Films to disk have been ordered for delivery to bedside. Nel NGUYEN is working on transfer paperwork. Will update all as details are finalized.

## 2018-05-09 NOTE — ADDENDUM NOTE
Encounter addended by: Miriam Huddleston R.N. on: 5/9/2018  7:48 AM<BR>    Actions taken: Flowsheet accepted